# Patient Record
Sex: FEMALE | Race: WHITE | NOT HISPANIC OR LATINO | Employment: UNEMPLOYED | URBAN - METROPOLITAN AREA
[De-identification: names, ages, dates, MRNs, and addresses within clinical notes are randomized per-mention and may not be internally consistent; named-entity substitution may affect disease eponyms.]

---

## 2017-06-27 ENCOUNTER — HOSPITAL ENCOUNTER (INPATIENT)
Facility: REHABILITATION | Age: 62
End: 2017-06-27
Attending: PHYSICAL MEDICINE & REHABILITATION | Admitting: PHYSICAL MEDICINE & REHABILITATION
Payer: COMMERCIAL

## 2017-10-03 ENCOUNTER — HOSPITAL ENCOUNTER (OUTPATIENT)
Dept: RADIOLOGY | Facility: MEDICAL CENTER | Age: 62
End: 2017-10-03
Attending: PHYSICAL MEDICINE & REHABILITATION
Payer: COMMERCIAL

## 2017-10-03 DIAGNOSIS — M54.2 CERVICALGIA: ICD-10-CM

## 2017-10-03 PROCEDURE — 72141 MRI NECK SPINE W/O DYE: CPT

## 2018-04-10 ENCOUNTER — HOSPITAL ENCOUNTER (OUTPATIENT)
Dept: LAB | Facility: MEDICAL CENTER | Age: 63
End: 2018-04-10
Attending: SPECIALIST
Payer: COMMERCIAL

## 2018-04-10 PROCEDURE — 88175 CYTOPATH C/V AUTO FLUID REDO: CPT

## 2018-04-11 LAB — CYTOLOGY REG CYTOL: NORMAL

## 2018-08-29 ENCOUNTER — HOSPITAL ENCOUNTER (OUTPATIENT)
Dept: RADIOLOGY | Facility: MEDICAL CENTER | Age: 63
End: 2018-08-29

## 2018-08-30 ENCOUNTER — HOSPITAL ENCOUNTER (OUTPATIENT)
Dept: RADIOLOGY | Facility: MEDICAL CENTER | Age: 63
End: 2018-08-30
Attending: PHYSICIAN ASSISTANT
Payer: COMMERCIAL

## 2018-08-30 DIAGNOSIS — Z12.39 SCREENING BREAST EXAMINATION: ICD-10-CM

## 2018-08-30 PROCEDURE — 77067 SCR MAMMO BI INCL CAD: CPT

## 2019-05-20 ENCOUNTER — HOSPITAL ENCOUNTER (OUTPATIENT)
Dept: LAB | Facility: MEDICAL CENTER | Age: 64
End: 2019-05-20
Attending: SPECIALIST
Payer: COMMERCIAL

## 2019-05-20 PROCEDURE — 369999 HCHG MISC LAB CHARGE

## 2019-05-20 PROCEDURE — 88142 CYTOPATH C/V THIN LAYER: CPT

## 2019-06-06 LAB — TEST NAME 95000: NORMAL

## 2023-06-23 ENCOUNTER — APPOINTMENT (RX ONLY)
Dept: URBAN - METROPOLITAN AREA CLINIC 6 | Facility: CLINIC | Age: 68
Setting detail: DERMATOLOGY
End: 2023-06-23

## 2023-06-23 DIAGNOSIS — L82.1 OTHER SEBORRHEIC KERATOSIS: ICD-10-CM

## 2023-06-23 DIAGNOSIS — Z71.89 OTHER SPECIFIED COUNSELING: ICD-10-CM

## 2023-06-23 DIAGNOSIS — S31000A OPEN WOUND(S) (MULTIPLE) OF UNSPECIFIED SITE(S), WITHOUT MENTION OF COMPLICATION: ICD-10-CM

## 2023-06-23 DIAGNOSIS — I78.8 OTHER DISEASES OF CAPILLARIES: ICD-10-CM

## 2023-06-23 DIAGNOSIS — L81.4 OTHER MELANIN HYPERPIGMENTATION: ICD-10-CM

## 2023-06-23 PROBLEM — S61.212A LACERATION WITHOUT FOREIGN BODY OF RIGHT MIDDLE FINGER WITHOUT DAMAGE TO NAIL, INITIAL ENCOUNTER: Status: ACTIVE | Noted: 2023-06-23

## 2023-06-23 PROCEDURE — ? DIAGNOSIS COMMENT

## 2023-06-23 PROCEDURE — ? COUNSELING

## 2023-06-23 PROCEDURE — 99203 OFFICE O/P NEW LOW 30 MIN: CPT

## 2023-06-23 ASSESSMENT — LOCATION ZONE DERM
LOCATION ZONE: NECK
LOCATION ZONE: FINGER
LOCATION ZONE: FACE
LOCATION ZONE: HAND

## 2023-06-23 ASSESSMENT — LOCATION DETAILED DESCRIPTION DERM
LOCATION DETAILED: RIGHT RADIAL DORSAL HAND
LOCATION DETAILED: LEFT RADIAL DORSAL HAND
LOCATION DETAILED: LEFT INFERIOR FOREHEAD
LOCATION DETAILED: LEFT CENTRAL LATERAL NECK
LOCATION DETAILED: LEFT INFERIOR CENTRAL MALAR CHEEK
LOCATION DETAILED: LEFT INFERIOR MEDIAL MALAR CHEEK
LOCATION DETAILED: RIGHT MIDDLE FINGER PROXIMAL INTERPHALANGEAL JOINT
LOCATION DETAILED: RIGHT INFERIOR CENTRAL MALAR CHEEK

## 2023-06-23 ASSESSMENT — LOCATION SIMPLE DESCRIPTION DERM
LOCATION SIMPLE: RIGHT CHEEK
LOCATION SIMPLE: RIGHT MIDDLE FINGER
LOCATION SIMPLE: LEFT CHEEK
LOCATION SIMPLE: LEFT FOREHEAD
LOCATION SIMPLE: RIGHT HAND
LOCATION SIMPLE: LEFT HAND
LOCATION SIMPLE: NECK

## 2023-06-23 NOTE — PROCEDURE: DIAGNOSIS COMMENT
Render Risk Assessment In Note?: no
Comment: She cut herself opening a canna food 2 weeks ago. Appears to be healing well with no signs of infection
Detail Level: Simple

## 2024-08-01 ENCOUNTER — APPOINTMENT (OUTPATIENT)
Dept: ADMISSIONS | Facility: MEDICAL CENTER | Age: 69
End: 2024-08-01
Attending: SPECIALIST
Payer: MEDICARE

## 2024-08-07 ENCOUNTER — PRE-ADMISSION TESTING (OUTPATIENT)
Dept: ADMISSIONS | Facility: MEDICAL CENTER | Age: 69
End: 2024-08-07
Attending: SPECIALIST
Payer: MEDICARE

## 2024-08-07 RX ORDER — BENAZEPRIL HYDROCHLORIDE 20 MG/1
20 TABLET ORAL EVERY MORNING
COMMUNITY

## 2024-08-07 RX ORDER — ESCITALOPRAM OXALATE 10 MG/1
10 TABLET ORAL EVERY MORNING
COMMUNITY

## 2024-08-07 RX ORDER — LEVOTHYROXINE SODIUM 75 UG/1
75 TABLET ORAL
COMMUNITY

## 2024-08-19 ENCOUNTER — HOSPITAL ENCOUNTER (OUTPATIENT)
Dept: RADIOLOGY | Facility: MEDICAL CENTER | Age: 69
End: 2024-08-19
Attending: SPECIALIST | Admitting: SPECIALIST
Payer: MEDICARE

## 2024-08-19 ENCOUNTER — PRE-ADMISSION TESTING (OUTPATIENT)
Dept: ADMISSIONS | Facility: MEDICAL CENTER | Age: 69
End: 2024-08-19
Attending: SPECIALIST
Payer: MEDICARE

## 2024-08-19 DIAGNOSIS — Z01.811 PRE-OPERATIVE RESPIRATORY EXAMINATION: ICD-10-CM

## 2024-08-19 DIAGNOSIS — Z01.810 PRE-OPERATIVE CARDIOVASCULAR EXAMINATION: ICD-10-CM

## 2024-08-19 DIAGNOSIS — Z01.812 PRE-OPERATIVE LABORATORY EXAMINATION: ICD-10-CM

## 2024-08-19 LAB
ABO GROUP BLD: NORMAL
ALBUMIN SERPL BCP-MCNC: 4.2 G/DL (ref 3.2–4.9)
ALBUMIN/GLOB SERPL: 1.5 G/DL
ALP SERPL-CCNC: 83 U/L (ref 30–99)
ALT SERPL-CCNC: 15 U/L (ref 2–50)
ANION GAP SERPL CALC-SCNC: 13 MMOL/L (ref 7–16)
AST SERPL-CCNC: 10 U/L (ref 12–45)
BASOPHILS # BLD AUTO: 1.2 % (ref 0–1.8)
BASOPHILS # BLD: 0.13 K/UL (ref 0–0.12)
BILIRUB SERPL-MCNC: 0.2 MG/DL (ref 0.1–1.5)
BLD GP AB SCN SERPL QL: NORMAL
BUN SERPL-MCNC: 11 MG/DL (ref 8–22)
CALCIUM ALBUM COR SERPL-MCNC: 10.7 MG/DL (ref 8.5–10.5)
CALCIUM SERPL-MCNC: 10.9 MG/DL (ref 8.5–10.5)
CHLORIDE SERPL-SCNC: 92 MMOL/L (ref 96–112)
CO2 SERPL-SCNC: 23 MMOL/L (ref 20–33)
CREAT SERPL-MCNC: 0.42 MG/DL (ref 0.5–1.4)
EKG IMPRESSION: NORMAL
EOSINOPHIL # BLD AUTO: 0.11 K/UL (ref 0–0.51)
EOSINOPHIL NFR BLD: 1 % (ref 0–6.9)
ERYTHROCYTE [DISTWIDTH] IN BLOOD BY AUTOMATED COUNT: 53.1 FL (ref 35.9–50)
GFR SERPLBLD CREATININE-BSD FMLA CKD-EPI: 107 ML/MIN/1.73 M 2
GLOBULIN SER CALC-MCNC: 2.8 G/DL (ref 1.9–3.5)
GLUCOSE SERPL-MCNC: 97 MG/DL (ref 65–99)
HCT VFR BLD AUTO: 43.7 % (ref 37–47)
HGB BLD-MCNC: 14.8 G/DL (ref 12–16)
IMM GRANULOCYTES # BLD AUTO: 0.09 K/UL (ref 0–0.11)
IMM GRANULOCYTES NFR BLD AUTO: 0.8 % (ref 0–0.9)
LYMPHOCYTES # BLD AUTO: 2.02 K/UL (ref 1–4.8)
LYMPHOCYTES NFR BLD: 18.1 % (ref 22–41)
MCH RBC QN AUTO: 30.1 PG (ref 27–33)
MCHC RBC AUTO-ENTMCNC: 33.9 G/DL (ref 32.2–35.5)
MCV RBC AUTO: 88.8 FL (ref 81.4–97.8)
MONOCYTES # BLD AUTO: 0.93 K/UL (ref 0–0.85)
MONOCYTES NFR BLD AUTO: 8.3 % (ref 0–13.4)
NEUTROPHILS # BLD AUTO: 7.88 K/UL (ref 1.82–7.42)
NEUTROPHILS NFR BLD: 70.6 % (ref 44–72)
NRBC # BLD AUTO: 0 K/UL
NRBC BLD-RTO: 0 /100 WBC (ref 0–0.2)
PLATELET # BLD AUTO: 302 K/UL (ref 164–446)
PMV BLD AUTO: 9.4 FL (ref 9–12.9)
POTASSIUM SERPL-SCNC: 4.9 MMOL/L (ref 3.6–5.5)
PROT SERPL-MCNC: 7 G/DL (ref 6–8.2)
RBC # BLD AUTO: 4.92 M/UL (ref 4.2–5.4)
RH BLD: NORMAL
SODIUM SERPL-SCNC: 128 MMOL/L (ref 135–145)
WBC # BLD AUTO: 11.2 K/UL (ref 4.8–10.8)

## 2024-08-19 PROCEDURE — 93005 ELECTROCARDIOGRAM TRACING: CPT

## 2024-08-19 PROCEDURE — 93010 ELECTROCARDIOGRAM REPORT: CPT | Performed by: INTERNAL MEDICINE

## 2024-08-19 PROCEDURE — 86901 BLOOD TYPING SEROLOGIC RH(D): CPT

## 2024-08-19 PROCEDURE — 71045 X-RAY EXAM CHEST 1 VIEW: CPT

## 2024-08-19 PROCEDURE — 36415 COLL VENOUS BLD VENIPUNCTURE: CPT

## 2024-08-19 PROCEDURE — 86850 RBC ANTIBODY SCREEN: CPT

## 2024-08-19 PROCEDURE — 86900 BLOOD TYPING SEROLOGIC ABO: CPT

## 2024-08-19 PROCEDURE — 80053 COMPREHEN METABOLIC PANEL: CPT

## 2024-08-19 PROCEDURE — 85025 COMPLETE CBC W/AUTO DIFF WBC: CPT

## 2024-08-29 ENCOUNTER — ANESTHESIA EVENT (OUTPATIENT)
Dept: SURGERY | Facility: MEDICAL CENTER | Age: 69
End: 2024-08-29
Payer: MEDICARE

## 2024-08-30 ENCOUNTER — ANESTHESIA (OUTPATIENT)
Dept: SURGERY | Facility: MEDICAL CENTER | Age: 69
End: 2024-08-30
Payer: MEDICARE

## 2024-08-30 ENCOUNTER — HOSPITAL ENCOUNTER (OUTPATIENT)
Facility: MEDICAL CENTER | Age: 69
End: 2024-08-30
Attending: SPECIALIST | Admitting: SPECIALIST
Payer: MEDICARE

## 2024-08-30 VITALS
HEIGHT: 63 IN | HEART RATE: 70 BPM | BODY MASS INDEX: 22.73 KG/M2 | OXYGEN SATURATION: 98 % | WEIGHT: 128.31 LBS | SYSTOLIC BLOOD PRESSURE: 209 MMHG | TEMPERATURE: 96.7 F | DIASTOLIC BLOOD PRESSURE: 99 MMHG | RESPIRATION RATE: 16 BRPM

## 2024-08-30 LAB
ABO + RH BLD: NORMAL
ALBUMIN SERPL BCP-MCNC: 4.5 G/DL (ref 3.2–4.9)
ALBUMIN/GLOB SERPL: 1.5 G/DL
ALP SERPL-CCNC: 88 U/L (ref 30–99)
ALT SERPL-CCNC: 12 U/L (ref 2–50)
ANION GAP SERPL CALC-SCNC: 15 MMOL/L (ref 7–16)
AST SERPL-CCNC: 17 U/L (ref 12–45)
BILIRUB SERPL-MCNC: 0.3 MG/DL (ref 0.1–1.5)
BUN SERPL-MCNC: 10 MG/DL (ref 8–22)
CALCIUM ALBUM COR SERPL-MCNC: 9.9 MG/DL (ref 8.5–10.5)
CALCIUM SERPL-MCNC: 10.3 MG/DL (ref 8.5–10.5)
CHLORIDE SERPL-SCNC: 95 MMOL/L (ref 96–112)
CO2 SERPL-SCNC: 20 MMOL/L (ref 20–33)
CREAT SERPL-MCNC: 0.37 MG/DL (ref 0.5–1.4)
GFR SERPLBLD CREATININE-BSD FMLA CKD-EPI: 111 ML/MIN/1.73 M 2
GLOBULIN SER CALC-MCNC: 3 G/DL (ref 1.9–3.5)
GLUCOSE SERPL-MCNC: 90 MG/DL (ref 65–99)
PATHOLOGY CONSULT NOTE: NORMAL
POTASSIUM SERPL-SCNC: 4.3 MMOL/L (ref 3.6–5.5)
PROT SERPL-MCNC: 7.5 G/DL (ref 6–8.2)
SODIUM SERPL-SCNC: 130 MMOL/L (ref 135–145)

## 2024-08-30 PROCEDURE — 160009 HCHG ANES TIME/MIN: Performed by: SPECIALIST

## 2024-08-30 PROCEDURE — A9270 NON-COVERED ITEM OR SERVICE: HCPCS | Performed by: ANESTHESIOLOGY

## 2024-08-30 PROCEDURE — 160048 HCHG OR STATISTICAL LEVEL 1-5: Performed by: SPECIALIST

## 2024-08-30 PROCEDURE — 700105 HCHG RX REV CODE 258: Performed by: SPECIALIST

## 2024-08-30 PROCEDURE — 160027 HCHG SURGERY MINUTES - 1ST 30 MINS LEVEL 2: Performed by: SPECIALIST

## 2024-08-30 PROCEDURE — 36415 COLL VENOUS BLD VENIPUNCTURE: CPT

## 2024-08-30 PROCEDURE — 88307 TISSUE EXAM BY PATHOLOGIST: CPT

## 2024-08-30 PROCEDURE — 160002 HCHG RECOVERY MINUTES (STAT): Performed by: SPECIALIST

## 2024-08-30 PROCEDURE — 80053 COMPREHEN METABOLIC PANEL: CPT

## 2024-08-30 PROCEDURE — 700111 HCHG RX REV CODE 636 W/ 250 OVERRIDE (IP): Performed by: ANESTHESIOLOGY

## 2024-08-30 PROCEDURE — 160025 RECOVERY II MINUTES (STATS): Performed by: SPECIALIST

## 2024-08-30 PROCEDURE — 700111 HCHG RX REV CODE 636 W/ 250 OVERRIDE (IP): Mod: JG | Performed by: ANESTHESIOLOGY

## 2024-08-30 PROCEDURE — 160046 HCHG PACU - 1ST 60 MINS PHASE II: Performed by: SPECIALIST

## 2024-08-30 PROCEDURE — 160038 HCHG SURGERY MINUTES - EA ADDL 1 MIN LEVEL 2: Performed by: SPECIALIST

## 2024-08-30 PROCEDURE — 700102 HCHG RX REV CODE 250 W/ 637 OVERRIDE(OP): Performed by: ANESTHESIOLOGY

## 2024-08-30 PROCEDURE — 88305 TISSUE EXAM BY PATHOLOGIST: CPT

## 2024-08-30 PROCEDURE — 160035 HCHG PACU - 1ST 60 MINS PHASE I: Performed by: SPECIALIST

## 2024-08-30 RX ORDER — IBUPROFEN 200 MG
400 TABLET ORAL EVERY 8 HOURS PRN
COMMUNITY

## 2024-08-30 RX ORDER — EPHEDRINE SULFATE 50 MG/ML
5 INJECTION, SOLUTION INTRAVENOUS
Status: DISCONTINUED | OUTPATIENT
Start: 2024-08-30 | End: 2024-08-30 | Stop reason: HOSPADM

## 2024-08-30 RX ORDER — DIPHENHYDRAMINE HYDROCHLORIDE 50 MG/ML
12.5 INJECTION INTRAMUSCULAR; INTRAVENOUS
Status: DISCONTINUED | OUTPATIENT
Start: 2024-08-30 | End: 2024-08-30 | Stop reason: HOSPADM

## 2024-08-30 RX ORDER — SODIUM CHLORIDE, SODIUM LACTATE, POTASSIUM CHLORIDE, CALCIUM CHLORIDE 600; 310; 30; 20 MG/100ML; MG/100ML; MG/100ML; MG/100ML
INJECTION, SOLUTION INTRAVENOUS CONTINUOUS
Status: DISCONTINUED | OUTPATIENT
Start: 2024-08-30 | End: 2024-08-30 | Stop reason: HOSPADM

## 2024-08-30 RX ORDER — HALOPERIDOL 5 MG/ML
1 INJECTION INTRAMUSCULAR
Status: DISCONTINUED | OUTPATIENT
Start: 2024-08-30 | End: 2024-08-30 | Stop reason: HOSPADM

## 2024-08-30 RX ORDER — HYDROMORPHONE HYDROCHLORIDE 1 MG/ML
0.4 INJECTION, SOLUTION INTRAMUSCULAR; INTRAVENOUS; SUBCUTANEOUS
Status: DISCONTINUED | OUTPATIENT
Start: 2024-08-30 | End: 2024-08-30 | Stop reason: HOSPADM

## 2024-08-30 RX ORDER — METOPROLOL TARTRATE 1 MG/ML
1 INJECTION, SOLUTION INTRAVENOUS
Status: DISCONTINUED | OUTPATIENT
Start: 2024-08-30 | End: 2024-08-30 | Stop reason: HOSPADM

## 2024-08-30 RX ORDER — ONDANSETRON 2 MG/ML
4 INJECTION INTRAMUSCULAR; INTRAVENOUS
Status: DISCONTINUED | OUTPATIENT
Start: 2024-08-30 | End: 2024-08-30 | Stop reason: HOSPADM

## 2024-08-30 RX ORDER — HYDROMORPHONE HYDROCHLORIDE 1 MG/ML
0.1 INJECTION, SOLUTION INTRAMUSCULAR; INTRAVENOUS; SUBCUTANEOUS
Status: DISCONTINUED | OUTPATIENT
Start: 2024-08-30 | End: 2024-08-30 | Stop reason: HOSPADM

## 2024-08-30 RX ORDER — SODIUM CHLORIDE, SODIUM LACTATE, POTASSIUM CHLORIDE, CALCIUM CHLORIDE 600; 310; 30; 20 MG/100ML; MG/100ML; MG/100ML; MG/100ML
INJECTION, SOLUTION INTRAVENOUS CONTINUOUS
Status: ACTIVE | OUTPATIENT
Start: 2024-08-30 | End: 2024-08-30

## 2024-08-30 RX ORDER — CELECOXIB 200 MG/1
200 CAPSULE ORAL ONCE
Status: COMPLETED | OUTPATIENT
Start: 2024-08-30 | End: 2024-08-30

## 2024-08-30 RX ORDER — DEXAMETHASONE SODIUM PHOSPHATE 4 MG/ML
INJECTION, SOLUTION INTRA-ARTICULAR; INTRALESIONAL; INTRAMUSCULAR; INTRAVENOUS; SOFT TISSUE PRN
Status: DISCONTINUED | OUTPATIENT
Start: 2024-08-30 | End: 2024-08-30 | Stop reason: SURG

## 2024-08-30 RX ORDER — ACETAMINOPHEN 500 MG
1000 TABLET ORAL ONCE
Status: COMPLETED | OUTPATIENT
Start: 2024-08-30 | End: 2024-08-30

## 2024-08-30 RX ORDER — OXYCODONE HCL 5 MG/5 ML
5 SOLUTION, ORAL ORAL
Status: DISCONTINUED | OUTPATIENT
Start: 2024-08-30 | End: 2024-08-30 | Stop reason: HOSPADM

## 2024-08-30 RX ORDER — MEPERIDINE HYDROCHLORIDE 25 MG/ML
12.5 INJECTION INTRAMUSCULAR; INTRAVENOUS; SUBCUTANEOUS
Status: DISCONTINUED | OUTPATIENT
Start: 2024-08-30 | End: 2024-08-30 | Stop reason: HOSPADM

## 2024-08-30 RX ORDER — MIDAZOLAM HYDROCHLORIDE 1 MG/ML
INJECTION INTRAMUSCULAR; INTRAVENOUS PRN
Status: DISCONTINUED | OUTPATIENT
Start: 2024-08-30 | End: 2024-08-30 | Stop reason: SURG

## 2024-08-30 RX ORDER — MIDAZOLAM HYDROCHLORIDE 1 MG/ML
1 INJECTION INTRAMUSCULAR; INTRAVENOUS
Status: DISCONTINUED | OUTPATIENT
Start: 2024-08-30 | End: 2024-08-30 | Stop reason: HOSPADM

## 2024-08-30 RX ORDER — HYDROMORPHONE HYDROCHLORIDE 1 MG/ML
0.2 INJECTION, SOLUTION INTRAMUSCULAR; INTRAVENOUS; SUBCUTANEOUS
Status: DISCONTINUED | OUTPATIENT
Start: 2024-08-30 | End: 2024-08-30 | Stop reason: HOSPADM

## 2024-08-30 RX ORDER — LABETALOL HYDROCHLORIDE 5 MG/ML
5 INJECTION, SOLUTION INTRAVENOUS
Status: DISCONTINUED | OUTPATIENT
Start: 2024-08-30 | End: 2024-08-30 | Stop reason: HOSPADM

## 2024-08-30 RX ORDER — ONDANSETRON 2 MG/ML
INJECTION INTRAMUSCULAR; INTRAVENOUS PRN
Status: DISCONTINUED | OUTPATIENT
Start: 2024-08-30 | End: 2024-08-30 | Stop reason: SURG

## 2024-08-30 RX ORDER — OXYCODONE HCL 5 MG/5 ML
10 SOLUTION, ORAL ORAL
Status: DISCONTINUED | OUTPATIENT
Start: 2024-08-30 | End: 2024-08-30 | Stop reason: HOSPADM

## 2024-08-30 RX ORDER — LABETALOL HYDROCHLORIDE 5 MG/ML
10 INJECTION, SOLUTION INTRAVENOUS
Status: DISCONTINUED | OUTPATIENT
Start: 2024-08-30 | End: 2024-08-30 | Stop reason: HOSPADM

## 2024-08-30 RX ADMIN — PROPOFOL 1 MG: 10 INJECTION, EMULSION INTRAVENOUS at 12:17

## 2024-08-30 RX ADMIN — PROPOFOL 120 MG: 10 INJECTION, EMULSION INTRAVENOUS at 12:20

## 2024-08-30 RX ADMIN — SODIUM CHLORIDE, POTASSIUM CHLORIDE, SODIUM LACTATE AND CALCIUM CHLORIDE: 600; 310; 30; 20 INJECTION, SOLUTION INTRAVENOUS at 10:32

## 2024-08-30 RX ADMIN — DEXAMETHASONE SODIUM PHOSPHATE 4 MG: 4 INJECTION INTRA-ARTICULAR; INTRALESIONAL; INTRAMUSCULAR; INTRAVENOUS; SOFT TISSUE at 12:29

## 2024-08-30 RX ADMIN — LABETALOL HYDROCHLORIDE 10 MG: 5 INJECTION, SOLUTION INTRAVENOUS at 12:01

## 2024-08-30 RX ADMIN — FENTANYL CITRATE 50 MCG: 50 INJECTION, SOLUTION INTRAMUSCULAR; INTRAVENOUS at 12:45

## 2024-08-30 RX ADMIN — ACETAMINOPHEN 1000 MG: 500 TABLET ORAL at 10:32

## 2024-08-30 RX ADMIN — ONDANSETRON 4 MG: 2 INJECTION INTRAMUSCULAR; INTRAVENOUS at 12:29

## 2024-08-30 RX ADMIN — LABETALOL HYDROCHLORIDE 10 MG: 5 INJECTION, SOLUTION INTRAVENOUS at 10:27

## 2024-08-30 RX ADMIN — CELECOXIB 200 MG: 200 CAPSULE ORAL at 10:32

## 2024-08-30 RX ADMIN — FENTANYL CITRATE 50 MCG: 50 INJECTION, SOLUTION INTRAMUSCULAR; INTRAVENOUS at 12:29

## 2024-08-30 RX ADMIN — LABETALOL HYDROCHLORIDE 5 MG: 5 INJECTION, SOLUTION INTRAVENOUS at 13:05

## 2024-08-30 RX ADMIN — MIDAZOLAM HYDROCHLORIDE 2 MG: 2 INJECTION, SOLUTION INTRAMUSCULAR; INTRAVENOUS at 12:17

## 2024-08-30 ASSESSMENT — FIBROSIS 4 INDEX: FIB4 SCORE: 0.56

## 2024-08-30 ASSESSMENT — PAIN DESCRIPTION - PAIN TYPE: TYPE: SURGICAL PAIN

## 2024-08-30 ASSESSMENT — PAIN SCALES - GENERAL: PAIN_LEVEL: 0

## 2024-08-30 NOTE — OR NURSING
1351- pt arrives via gurney to phase II, ambulates to BR and voids with no difficulty.  Dresses self independently.     1408-PIV d/c;'d intact.  Discharge instructions given to pt and - verbalize understanding    1410- pt escorted out in w/c, all belongings accounted for.

## 2024-08-30 NOTE — ANESTHESIA POSTPROCEDURE EVALUATION
Patient: Pallavi Sivla    Procedure Summary       Date: 08/30/24 Room / Location: [place] [address] / SURGERY [facility]    Anesthesia Start: 1212 Anesthesia Stop: 1304    Procedure: LOOP ELECTOSURGICAL EXCISION PROCEDURE (Vagina ) Diagnosis: (LESS CERVICAL CANCER)    Surgeons: Jared Strauss M.D. Responsible Provider: Tyler Concepcion M.D.    Anesthesia Type: general ASA Status: 2            Final Anesthesia Type: general  Last vitals  BP   Blood Pressure : (!) 209/99, NIBP: (!) 192/93    Temp   36.4 °C (97.5 °F)    Pulse   69   Resp   18    SpO2   97 %      Anesthesia Post Evaluation    Patient location during evaluation: PACU  Patient participation: complete - patient participated  Level of consciousness: awake and alert  Pain score: 0    Airway patency: patent  Anesthetic complications: no  Cardiovascular status: hemodynamically stable  Respiratory status: acceptable  Hydration status: euvolemic    PONV: none          No notable events documented.     Nurse Pain Score: 0 (NPRS)

## 2024-08-30 NOTE — ANESTHESIA PREPROCEDURE EVALUATION
Case: [id number] Date/Time: 08/30/24 1115    Procedure: LOOP ELECTOSURGICAL EXCISION PROCEDURE    Pre-op diagnosis: MODERATE VULVAR DYSPLASIA    Location: [facility] OR 17 / SURGERY [facility]    Surgeons: Jared Strauss M.D.            Relevant Problems   No relevant active problems       Physical Exam    Airway   Mallampati: II  TM distance: >3 FB  Neck ROM: full       Cardiovascular - normal exam  Rhythm: regular  Rate: normal  (-) murmur     Dental - normal exam           Pulmonary - normal exam  Breath sounds clear to auscultation     Abdominal    Neurological - normal exam                   Anesthesia Plan    ASA 2       Plan - general       Airway plan will be LMA          Induction: intravenous    Postoperative Plan: Postoperative administration of opioids is intended.    Pertinent diagnostic labs and testing reviewed    Informed Consent:    Anesthetic plan and risks discussed with patient.    Use of blood products discussed with: patient whom consented to blood products.

## 2024-08-30 NOTE — DISCHARGE INSTRUCTIONS
What to Expect Post Anesthesia    Rest and take it easy for the first 24 hours.  A responsible adult is recommended to remain with you during that time.  It is normal to feel sleepy.  We encourage you to not do anything that requires balance, judgment or coordination.    FOR 24 HOURS DO NOT:  Drive, operate machinery or run household appliances.  Drink beer or alcoholic beverages.  Make important decisions or sign legal documents.    To avoid nausea, slowly advance diet as tolerated, avoiding spicy or greasy foods for the first day.  Add more substantial food to your diet according to your provider's instructions.  Babies can be fed formula or breast milk as soon as they are hungry.  INCREASE FLUIDS AND FIBER TO AVOID CONSTIPATION.    MILD FLU-LIKE SYMPTOMS ARE NORMAL.  YOU MAY EXPERIENCE GENERALIZED MUSCLE ACHES, THROAT IRRITATION, HEADACHE AND/OR SOME NAUSEA.    If any questions arise, call your provider.  If your provider is not available, please feel free to call the Surgical Center at [phone].    MEDICATIONS: Resume taking daily medication.  Take prescribed pain medication with food.  If no medication is prescribed, you may take non-aspirin pain medication if needed.  PAIN MEDICATION CAN BE VERY CONSTIPATING.  Take a stool softener or laxative such as senokot, pericolace, or milk of magnesia if needed.    Last pain medication given Tylenol and Celebrex (NSAID, similar to ibuprofen) at 10:30am        1. No heavy lifting greater than 10 pounds for a minimum 4 weeks  2. Nothing in the vagina (ie no tampons, douching, intercourse) for a minimum of 4 weeks  3. No driving while taking narcotics  4. Call our office [phone] if you develop any fevers, chills, nausea/vomiting, heavy vaginal bleeding, or redness, tenderness, and/or  drainage from your wound, if you have persistent watery discharge while ambulating or stool draining from the vagina.  5. Showering with warm water is ok, no bathing or  swimming  6. You may have vaginal spotting or light bleeding which is normal.  7. If you have not had a bowel movement for 2 days, please take over the counter Milk of Magnesium, 1 tablespoon every 4 hours. After 4  doses and if you still have not had a bowel movement, please call your doctor.  8. You may eat a soft diet, such as soup, liquid, for day #1 and if tolerating you may resume your regular diet

## 2024-08-30 NOTE — PROGRESS NOTES
Medication history reviewed with PT at bedside    Med rec is complete per PT reporting    Allergies reviewed.     Patient denies any outpatient antibiotics in the last 30 days.     Patient is not taking anticoagulants.    Preferred pharmacy for this visit - [facility] on [address] ([phone])

## 2024-08-30 NOTE — OR NURSING
Assume care for patient in pre-op. Allergies and NPO status verified. Consents for surgical procedure signed and on chart. PIV started.  Call light within reach. Bed at lowest position.  0957 notified anesthesiologist regarding pt /103 and he ordered lobetalol 10 mg and may give another dose after 15 minutes if bp is more than 160.   rechecked bp of 209/99 and gave another dose of lobetalol 10 mg at 1201 bp is 180/92.

## 2024-08-30 NOTE — OR SURGEON
Immediate Post OP Note    PreOp Diagnosis: Cervical lesion rule out cervical cancer    PostOp Diagnosis: same as above      Procedure(s):  LOOP ELECTOSURGICAL EXCISION PROCEDURE - Wound Class: Clean Contaminated    Surgeon(s):  Jared Strauss M.D.    Anesthesiologist/Type of Anesthesia:  Anesthesiologist: Tyler Concepcion M.D./General    Surgical Staff:  Circulator: Irina Stephens Circulator: Lindsay Cruz R.N.  Scrub Person: Nola Nixon    Specimens removed if any:  ID Type Source Tests Collected by Time Destination   A : CONE Tissue Cervix PATHOLOGY SPECIMEN Jared Strauss M.D. 8/30/2024 12:50 PM    B : ENDOCERVICAL CURETTAGE Tissue Cervix PATHOLOGY SPECIMEN Jared Strauss M.D. 8/30/2024 12:51 PM        Estimated Blood Loss: minimal     Findings: normal cervix, bimanual examination revealed normal uterus with no evidence of parametrial involvement no evidence of adnexal masses or cul-de-sac nodularity.    Complications: none        8/30/2024 1:32 PM Jared Strauss M.D.

## 2024-08-30 NOTE — OP REPORT
PreOp Diagnosis: Cervical lesion rule out cervical cancer     PostOp Diagnosis: same as above        Procedure(s):  LOOP ELECTOSURGICAL EXCISION PROCEDURE - Wound Class: Clean Contaminated     Surgeon(s):  Jared Strauss M.D.     Anesthesiologist/Type of Anesthesia:  Anesthesiologist: Tyler Concepcion M.D./General     Surgical Staff:  Circulator: Irina Ross  Relief Circulator: Lindsay Cruz R.N.  Scrub Person: Nola SUZANNE Nixon     Specimens removed if any:  ID Type Source Tests Collected by Time Destination   A : CONE Tissue Cervix PATHOLOGY SPECIMEN Jared Strauss M.D. 8/30/2024 12:50 PM     B : ENDOCERVICAL CURETTAGE Tissue Cervix PATHOLOGY SPECIMEN Jared Strauss M.D. 8/30/2024 12:51 PM           Estimated Blood Loss: minimal      Findings: normal cervix, bimanual examination revealed normal uterus with no evidence of parametrial involvement no evidence of adnexal masses or cul-de-sac nodularity.     Complications: none       Indication: Mrs. 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 She has been monitored on and off by our office as well as Dr. Schultz. She underwent LEEP conization of the cervix and extensive vaporization of the vulva on 8/2/2011. She has hisotry of LGSIL with HR HPV. On 4/18/24 Pap smear results showed atypical squamous cells HPV positive Mrs. Silva was evalauted with coloposcopy and she was found to have suspicious lesion. Given her history she is recommended. to undergo cone biopsy to rule out recurrent disease. Thus she is being admitted to undergo  LEEP cone bx to rule out invasive cancer.     Procedure: After achieving adequate anesthesia patient was prepped and draped in place modified dorsolithotomy position.  Surgical timeout was called after surgical team agreed we proceeded.    Initially I performed examination esthesia the findings are noted as above.  Upon the completion of this heavy  weighted speculum was placed in the posterior vagina Batista retractor was placed in the anterior vagina.  Cervix was brought to view single-tooth tenaculum was then cauterized to the anterior lip of the cervix.  A 1 x 1 cm loop excision was then performed with excellent hemostasis.  This was followed by ECC.  Minimal tissue was obtained.  Patient tolerated procedure well without any difficulties was extubated and transferred to the PACU in stable condition.

## 2024-08-30 NOTE — OR NURSING
1301: To PACU from OR via gurney, awake/alert, denies pain/nausea,respirations spontaneous and non-labored. No visible mónica drainage. Plan antihypertensive. Pt states she is routinely on antihypertensives but stopped 2 days ago per pre-op instructions.  1310: O2 d/jerman, commenced po water.  1315: Tolerates po water.  1330: No change in surgical site assessment.Meets criteria to transfer to Stage 2.

## 2024-08-30 NOTE — ANESTHESIA PROCEDURE NOTES
Airway    Date/Time: 8/30/2024 12:24 PM    Performed by: Tyler Concepcion M.D.  Authorized by: Tyler Concepcion M.D.    Location:  OR  Urgency:  Elective  Indications for Airway Management:  Anesthesia      Spontaneous Ventilation: absent    Sedation Level:  Deep  Preoxygenated: Yes    Mask Difficulty Assessment:  1 - vent by mask  Final Airway Type:  Supraglottic airway  Final Supraglottic Airway:  Standard LMA    SGA Size:  4  Number of Attempts at Approach:  1

## 2024-08-30 NOTE — ANESTHESIA TIME REPORT
Anesthesia Start and Stop Event Times       Date Time Event    8/30/2024 1150 Ready for Procedure     1212 Anesthesia Start     1304 Anesthesia Stop          Responsible Staff  08/30/24      Name Role Begin End    Tyler Concepcion M.D. Anesth 1212 1304          Overtime Reason:  no overtime (within assigned shift)    Comments:

## 2025-05-29 ENCOUNTER — APPOINTMENT (OUTPATIENT)
Dept: ADMISSIONS | Facility: MEDICAL CENTER | Age: 70
End: 2025-05-29
Attending: SPECIALIST
Payer: MEDICARE

## 2025-06-09 ENCOUNTER — PRE-ADMISSION TESTING (OUTPATIENT)
Dept: ADMISSIONS | Facility: MEDICAL CENTER | Age: 70
End: 2025-06-09
Attending: SPECIALIST
Payer: MEDICARE

## 2025-06-09 NOTE — PREADMIT AVS NOTE
Current Medications   Medication Instructions    ibuprofen (MOTRIN) 200 MG Tab Stop 5 days before surgery    levothyroxine (SYNTHROID) 75 MCG Tab Continue taking as prescribed.    benazepril (LOTENSIN) 20 MG Tab Stop 24 hours before surgery    escitalopram (LEXAPRO) 10 MG Tab Continue taking as prescribed.    GARLIC PO Stop 7 days before surgery    CALCIUM WITH VITAMIN D  Stop 7 days before surgery

## 2025-06-13 ENCOUNTER — ANESTHESIA EVENT (OUTPATIENT)
Dept: SURGERY | Facility: MEDICAL CENTER | Age: 70
End: 2025-06-13
Payer: MEDICARE

## 2025-06-13 ENCOUNTER — HOSPITAL ENCOUNTER (OUTPATIENT)
Facility: MEDICAL CENTER | Age: 70
End: 2025-06-13
Attending: SPECIALIST | Admitting: SPECIALIST
Payer: MEDICARE

## 2025-06-13 ENCOUNTER — ANESTHESIA (OUTPATIENT)
Dept: SURGERY | Facility: MEDICAL CENTER | Age: 70
End: 2025-06-13
Payer: MEDICARE

## 2025-06-13 VITALS
DIASTOLIC BLOOD PRESSURE: 79 MMHG | OXYGEN SATURATION: 93 % | HEART RATE: 85 BPM | WEIGHT: 128.75 LBS | SYSTOLIC BLOOD PRESSURE: 178 MMHG | BODY MASS INDEX: 22.81 KG/M2 | HEIGHT: 63 IN | RESPIRATION RATE: 16 BRPM | TEMPERATURE: 96.9 F

## 2025-06-13 LAB
APTT PPP: 35 SEC (ref 24.7–36)
EKG IMPRESSION: NORMAL
INR PPP: 0.93 (ref 0.87–1.13)
PATHOLOGY CONSULT NOTE: NORMAL
PROTHROMBIN TIME: 12.4 SEC (ref 12–14.6)

## 2025-06-13 PROCEDURE — 700111 HCHG RX REV CODE 636 W/ 250 OVERRIDE (IP): Performed by: STUDENT IN AN ORGANIZED HEALTH CARE EDUCATION/TRAINING PROGRAM

## 2025-06-13 PROCEDURE — 160048 HCHG OR STATISTICAL LEVEL 1-5: Performed by: SPECIALIST

## 2025-06-13 PROCEDURE — 700105 HCHG RX REV CODE 258: Performed by: SPECIALIST

## 2025-06-13 PROCEDURE — 88305 TISSUE EXAM BY PATHOLOGIST: CPT | Mod: 26 | Performed by: PATHOLOGY

## 2025-06-13 PROCEDURE — 85730 THROMBOPLASTIN TIME PARTIAL: CPT

## 2025-06-13 PROCEDURE — 160015 HCHG STAT PREOP MINUTES: Performed by: SPECIALIST

## 2025-06-13 PROCEDURE — 700102 HCHG RX REV CODE 250 W/ 637 OVERRIDE(OP): Performed by: STUDENT IN AN ORGANIZED HEALTH CARE EDUCATION/TRAINING PROGRAM

## 2025-06-13 PROCEDURE — 93005 ELECTROCARDIOGRAM TRACING: CPT | Mod: TC | Performed by: SPECIALIST

## 2025-06-13 PROCEDURE — A9270 NON-COVERED ITEM OR SERVICE: HCPCS | Performed by: STUDENT IN AN ORGANIZED HEALTH CARE EDUCATION/TRAINING PROGRAM

## 2025-06-13 PROCEDURE — 700101 HCHG RX REV CODE 250: Performed by: STUDENT IN AN ORGANIZED HEALTH CARE EDUCATION/TRAINING PROGRAM

## 2025-06-13 PROCEDURE — 160193 HCHG PACU STANDARD - 1ST 60 MINS: Performed by: SPECIALIST

## 2025-06-13 PROCEDURE — 85610 PROTHROMBIN TIME: CPT

## 2025-06-13 PROCEDURE — 88342 IMHCHEM/IMCYTCHM 1ST ANTB: CPT | Mod: 91 | Performed by: PATHOLOGY

## 2025-06-13 PROCEDURE — 160046 HCHG PACU - 1ST 60 MINS PHASE II: Performed by: SPECIALIST

## 2025-06-13 PROCEDURE — 160025 RECOVERY II MINUTES (STATS): Performed by: SPECIALIST

## 2025-06-13 PROCEDURE — 160038 HCHG SURGERY MINUTES - EA ADDL 1 MIN LEVEL 2: Performed by: SPECIALIST

## 2025-06-13 PROCEDURE — 160191 HCHG ANESTHESIA STANDARD: Performed by: SPECIALIST

## 2025-06-13 PROCEDURE — 700101 HCHG RX REV CODE 250: Performed by: SPECIALIST

## 2025-06-13 PROCEDURE — 88305 TISSUE EXAM BY PATHOLOGIST: CPT | Performed by: PATHOLOGY

## 2025-06-13 PROCEDURE — 93010 ELECTROCARDIOGRAM REPORT: CPT | Performed by: INTERNAL MEDICINE

## 2025-06-13 PROCEDURE — 160002 HCHG RECOVERY MINUTES (STAT): Performed by: SPECIALIST

## 2025-06-13 PROCEDURE — 700105 HCHG RX REV CODE 258: Performed by: STUDENT IN AN ORGANIZED HEALTH CARE EDUCATION/TRAINING PROGRAM

## 2025-06-13 PROCEDURE — 160027 HCHG SURGERY MINUTES - 1ST 30 MINS LEVEL 2: Performed by: SPECIALIST

## 2025-06-13 PROCEDURE — 88342 IMHCHEM/IMCYTCHM 1ST ANTB: CPT | Mod: 26 | Performed by: PATHOLOGY

## 2025-06-13 PROCEDURE — 36415 COLL VENOUS BLD VENIPUNCTURE: CPT

## 2025-06-13 RX ORDER — ACETAMINOPHEN 500 MG
500 TABLET ORAL ONCE
Status: COMPLETED | OUTPATIENT
Start: 2025-06-13 | End: 2025-06-13

## 2025-06-13 RX ORDER — SODIUM CHLORIDE, SODIUM LACTATE, POTASSIUM CHLORIDE, CALCIUM CHLORIDE 600; 310; 30; 20 MG/100ML; MG/100ML; MG/100ML; MG/100ML
INJECTION, SOLUTION INTRAVENOUS
Status: DISCONTINUED | OUTPATIENT
Start: 2025-06-13 | End: 2025-06-13 | Stop reason: SURG

## 2025-06-13 RX ORDER — HYDRALAZINE HYDROCHLORIDE 20 MG/ML
5 INJECTION INTRAMUSCULAR; INTRAVENOUS
Status: DISCONTINUED | OUTPATIENT
Start: 2025-06-13 | End: 2025-06-13 | Stop reason: HOSPADM

## 2025-06-13 RX ORDER — CELECOXIB 200 MG/1
200 CAPSULE ORAL ONCE
Status: COMPLETED | OUTPATIENT
Start: 2025-06-13 | End: 2025-06-13

## 2025-06-13 RX ORDER — ONDANSETRON 2 MG/ML
4 INJECTION INTRAMUSCULAR; INTRAVENOUS
Status: DISCONTINUED | OUTPATIENT
Start: 2025-06-13 | End: 2025-06-13 | Stop reason: HOSPADM

## 2025-06-13 RX ORDER — CEFAZOLIN SODIUM 1 G/3ML
INJECTION, POWDER, FOR SOLUTION INTRAMUSCULAR; INTRAVENOUS PRN
Status: DISCONTINUED | OUTPATIENT
Start: 2025-06-13 | End: 2025-06-13 | Stop reason: SURG

## 2025-06-13 RX ORDER — BUPIVACAINE HYDROCHLORIDE AND EPINEPHRINE 2.5; 5 MG/ML; UG/ML
INJECTION, SOLUTION EPIDURAL; INFILTRATION; INTRACAUDAL; PERINEURAL
Status: DISCONTINUED | OUTPATIENT
Start: 2025-06-13 | End: 2025-06-13 | Stop reason: HOSPADM

## 2025-06-13 RX ORDER — ACETAMINOPHEN 325 MG/1
325 TABLET ORAL EVERY 4 HOURS PRN
COMMUNITY

## 2025-06-13 RX ORDER — MIDAZOLAM HYDROCHLORIDE 1 MG/ML
INJECTION INTRAMUSCULAR; INTRAVENOUS PRN
Status: DISCONTINUED | OUTPATIENT
Start: 2025-06-13 | End: 2025-06-13 | Stop reason: SURG

## 2025-06-13 RX ORDER — LIDOCAINE HYDROCHLORIDE 20 MG/ML
INJECTION, SOLUTION EPIDURAL; INFILTRATION; INTRACAUDAL; PERINEURAL PRN
Status: DISCONTINUED | OUTPATIENT
Start: 2025-06-13 | End: 2025-06-13 | Stop reason: SURG

## 2025-06-13 RX ORDER — ONDANSETRON 2 MG/ML
INJECTION INTRAMUSCULAR; INTRAVENOUS PRN
Status: DISCONTINUED | OUTPATIENT
Start: 2025-06-13 | End: 2025-06-13 | Stop reason: SURG

## 2025-06-13 RX ORDER — ALBUTEROL SULFATE 5 MG/ML
2.5 SOLUTION RESPIRATORY (INHALATION)
Status: DISCONTINUED | OUTPATIENT
Start: 2025-06-13 | End: 2025-06-13 | Stop reason: HOSPADM

## 2025-06-13 RX ORDER — HALOPERIDOL 5 MG/ML
1 INJECTION INTRAMUSCULAR
Status: DISCONTINUED | OUTPATIENT
Start: 2025-06-13 | End: 2025-06-13 | Stop reason: HOSPADM

## 2025-06-13 RX ORDER — SODIUM CHLORIDE, SODIUM LACTATE, POTASSIUM CHLORIDE, CALCIUM CHLORIDE 600; 310; 30; 20 MG/100ML; MG/100ML; MG/100ML; MG/100ML
INJECTION, SOLUTION INTRAVENOUS CONTINUOUS
Status: DISCONTINUED | OUTPATIENT
Start: 2025-06-13 | End: 2025-06-13 | Stop reason: HOSPADM

## 2025-06-13 RX ORDER — EPHEDRINE SULFATE 50 MG/ML
5 INJECTION, SOLUTION INTRAVENOUS
Status: DISCONTINUED | OUTPATIENT
Start: 2025-06-13 | End: 2025-06-13 | Stop reason: HOSPADM

## 2025-06-13 RX ORDER — SODIUM CHLORIDE, SODIUM LACTATE, POTASSIUM CHLORIDE, CALCIUM CHLORIDE 600; 310; 30; 20 MG/100ML; MG/100ML; MG/100ML; MG/100ML
INJECTION, SOLUTION INTRAVENOUS CONTINUOUS
Status: ACTIVE | OUTPATIENT
Start: 2025-06-13 | End: 2025-06-13

## 2025-06-13 RX ORDER — CETIRIZINE HYDROCHLORIDE 10 MG/1
10 TABLET ORAL DAILY
COMMUNITY

## 2025-06-13 RX ORDER — EPHEDRINE SULFATE 50 MG/ML
INJECTION, SOLUTION INTRAVENOUS PRN
Status: DISCONTINUED | OUTPATIENT
Start: 2025-06-13 | End: 2025-06-13 | Stop reason: SURG

## 2025-06-13 RX ORDER — ACETAMINOPHEN 500 MG
1000 TABLET ORAL ONCE
Status: DISCONTINUED | OUTPATIENT
Start: 2025-06-13 | End: 2025-06-13 | Stop reason: DRUGHIGH

## 2025-06-13 RX ORDER — DIPHENHYDRAMINE HYDROCHLORIDE 50 MG/ML
12.5 INJECTION, SOLUTION INTRAMUSCULAR; INTRAVENOUS
Status: DISCONTINUED | OUTPATIENT
Start: 2025-06-13 | End: 2025-06-13 | Stop reason: HOSPADM

## 2025-06-13 RX ORDER — DEXAMETHASONE SODIUM PHOSPHATE 4 MG/ML
INJECTION, SOLUTION INTRA-ARTICULAR; INTRALESIONAL; INTRAMUSCULAR; INTRAVENOUS; SOFT TISSUE PRN
Status: DISCONTINUED | OUTPATIENT
Start: 2025-06-13 | End: 2025-06-13 | Stop reason: SURG

## 2025-06-13 RX ADMIN — FENTANYL CITRATE 25 MCG: 50 INJECTION, SOLUTION INTRAMUSCULAR; INTRAVENOUS at 11:10

## 2025-06-13 RX ADMIN — DEXAMETHASONE SODIUM PHOSPHATE 4 MG: 4 INJECTION INTRA-ARTICULAR; INTRALESIONAL; INTRAMUSCULAR; INTRAVENOUS; SOFT TISSUE at 10:50

## 2025-06-13 RX ADMIN — CELECOXIB 200 MG: 200 CAPSULE ORAL at 08:39

## 2025-06-13 RX ADMIN — CEFAZOLIN 2 G: 1 INJECTION, POWDER, FOR SOLUTION INTRAMUSCULAR; INTRAVENOUS at 10:47

## 2025-06-13 RX ADMIN — ONDANSETRON 4 MG: 2 INJECTION INTRAMUSCULAR; INTRAVENOUS at 11:10

## 2025-06-13 RX ADMIN — FENTANYL CITRATE 25 MCG: 50 INJECTION, SOLUTION INTRAMUSCULAR; INTRAVENOUS at 11:03

## 2025-06-13 RX ADMIN — PROPOFOL 100 MG: 10 INJECTION, EMULSION INTRAVENOUS at 10:47

## 2025-06-13 RX ADMIN — SODIUM CHLORIDE, POTASSIUM CHLORIDE, SODIUM LACTATE AND CALCIUM CHLORIDE: 600; 310; 30; 20 INJECTION, SOLUTION INTRAVENOUS at 10:42

## 2025-06-13 RX ADMIN — ACETAMINOPHEN 500 MG: 500 TABLET ORAL at 08:39

## 2025-06-13 RX ADMIN — LIDOCAINE HYDROCHLORIDE 50 MG: 20 INJECTION, SOLUTION EPIDURAL; INFILTRATION; INTRACAUDAL; PERINEURAL at 10:47

## 2025-06-13 RX ADMIN — SODIUM CHLORIDE, POTASSIUM CHLORIDE, SODIUM LACTATE AND CALCIUM CHLORIDE: 600; 310; 30; 20 INJECTION, SOLUTION INTRAVENOUS at 08:39

## 2025-06-13 RX ADMIN — EPHEDRINE SULFATE 20 MG: 50 INJECTION, SOLUTION INTRAVENOUS at 10:58

## 2025-06-13 RX ADMIN — MIDAZOLAM HYDROCHLORIDE 1.5 MG: 1 INJECTION, SOLUTION INTRAMUSCULAR; INTRAVENOUS at 10:40

## 2025-06-13 ASSESSMENT — FIBROSIS 4 INDEX
FIB4 SCORE: 1.09
FIB4 SCORE: 1.09

## 2025-06-13 ASSESSMENT — PAIN SCALES - GENERAL: PAIN_LEVEL: 2

## 2025-06-13 NOTE — ANESTHESIA TIME REPORT
Anesthesia Start and Stop Event Times       Date Time Event    6/13/2025 0855 Ready for Procedure     1042 Anesthesia Start     1117 Anesthesia Stop          Responsible Staff  06/13/25      Name Role Begin End    Yunior Concepcion D.O. Anesth 1042 1117          Overtime Reason:  no overtime (within assigned shift)    Comments:

## 2025-06-13 NOTE — ANESTHESIA POSTPROCEDURE EVALUATION
Patient: Pallavi Silva    Procedure Summary       Date: 06/13/25 Room / Location: [place] [address] / SURGERY [facility]    Anesthesia Start: 1042 Anesthesia Stop: 1117    Procedure: VAGINAL EXAM UNDER ANESTHESIA, RIGHT VULVAR BIOPSY AND SUBURETHRAL BIOPSY (Vulva) Diagnosis: (VULVAR DYSPLASIA)    Surgeons: Jared Strauss M.D. Responsible Provider: Yunior Concepcion D.O.    Anesthesia Type: general ASA Status: 2            Final Anesthesia Type: general  Last vitals  BP   Blood Pressure : (!) 186/77    Temp   36.7 °C (98 °F)    Pulse   80   Resp   17    SpO2   97 %      Anesthesia Post Evaluation    Patient location during evaluation: PACU  Patient participation: complete - patient participated  Level of consciousness: awake and alert  Pain score: 2    Airway patency: patent  Anesthetic complications: no  Cardiovascular status: hemodynamically stable  Respiratory status: acceptable  Hydration status: euvolemic    PONV: none          No notable events documented.     Nurse Pain Score: 0 (NPRS)

## 2025-06-13 NOTE — ANESTHESIA PROCEDURE NOTES
Airway    Date/Time: 6/13/2025 10:48 AM    Performed by: Yunior Concepcion D.O.  Authorized by: Yunior Concepcion D.O.    Location:  OR  Urgency:  Elective  Indications for Airway Management:  Anesthesia      Spontaneous Ventilation: absent    Sedation Level:  Deep  Preoxygenated: Yes    Final Airway Type:  Supraglottic airway  Final Supraglottic Airway:  Standard LMA    SGA Size:  4  Number of Attempts at Approach:  1

## 2025-06-13 NOTE — PROGRESS NOTES
Pharmacy Medication Reconciliation      ~Medication reconciliation updated and complete per patient   ~Allergies have been verified and updated   ~No oral ABX within the last 30 days  ~Is dispense history available in EPIC: no  ~Patient home pharmacy :  [facility] [phone]      ~Anticoagulants (rivaroxaban, apixaban, edoxaban, dabigatran, warfarin, enoxaparin) taken in the last 14 days? no

## 2025-06-13 NOTE — OP REPORT
PreOp Diagnosis: Right vulvar dysplasia rule out BIANKA 3  Questionable suburethral lesion  History of cervical cancer status post chemoradiation        PostOp Diagnosis: Right superior and inferior vulvar dysplasia  No evidence of suburethral lesion        Procedure(s):  VAGINAL EXAM UNDER ANESTHESIA, RIGHT VULVAR BIOPSY - Wound Class: Clean Contaminated     Surgeon(s):  Jared Strauss M.D.     Anesthesiologist/Type of Anesthesia:  Anesthesiologist: Yunior Concepcion D.O./General     Surgical Staff:  Assistant: Katie Mccollum P.A.-C.  Circulator: Mya Singh R.N.  Scrub Person: Nolasean Kooland     Specimens removed if any:  ID Type Source Tests Collected by Time Destination   A : Right inferior vulva Other Other PATHOLOGY SPECIMEN Jared Strauss M.D. 6/13/2025 11:05 AM     B : Right superior vulva Other Other PATHOLOGY SPECIMEN Jared Strauss M.D. 6/13/2025 11:05 AM           Estimated Blood Loss: Minimal     Findings: Examination anesthesia revealed a dysplastic lesion in the right inferior vulvar and erythematous lesion in the right superior vulva although this did not appear dysplastic and looks worrisome.  Previous suburethral lesion that was noted in the office I cannot appreciate.  Thus for this reason we did not proceed with a biopsy of the suburethral.  We have performed a 5 mm punch biopsy at the right superior and inferior vulva.     Complications: None     Indication: Mrs. Sagar Corral is a pleasant 69-year-old female who has history of cervical cancer.  Patient is being monitored for cervical cancer over the years.  Patient most recently had a routine checkup.  She was noted to have an ASCUS abnormal Pap.  Patient was seen and examined.  She was noted to have vulvar lesion and a suburethral lesion that was suspicious for BIANKA 3.  Patient was advised undergo examination under anesthesia and a biopsy.  Intraoperative biopsy was indicated as patient previously had radiation was concerned that we would not have  sufficient control of potential bleeding thus she was done under examination anesthesia for this procedure.    Procedure: After achieving adequate anesthesia patient was prepped and draped in place modified dorsolithotomy position.  Surgical timeout was called if the surgical team agreed with proceeded.    Examination anesthesia was performed.  Lesion was noted.  5 mm [place] punch biopsy was used to perform a biopsy below the subcutaneous tissue the tissue was then excised.  The excision site was then reapproximated with 2-0 Monocryl suture in a mattress fashion excellent hemostasis achieved.  The area of the biopsy site was then injected with quarter percent Marcaine for local anesthetic effect.  Patient tolerated procedure well without any difficulties was extubated and transferred to the PACU in stable condition.

## 2025-06-13 NOTE — OR NURSING
/79, asymptomatic, Per Dr Concepcion pt okay to d/c home and resume home HTN medications. Should pt experience HTN symptoms,return to ER. Pt and family advised and verbalized understanding.

## 2025-06-13 NOTE — DISCHARGE INSTRUCTIONS
HOME CARE INSTRUCTIONS    ACTIVITY: Rest and take it easy for the first 24 hours.  A responsible adult is recommended to remain with you during that time.  It is normal to feel sleepy.  We encourage you to not do anything that requires balance, judgment or coordination.    FOR 24 HOURS DO NOT:  Drive, operate machinery or run household appliances.  Drink beer or alcoholic beverages.  Make important decisions or sign legal documents.    SPECIAL INSTRUCTIONS:   1. Call our office [phone] if you develop any fevers, chills, nausea/vomiting, vulvar redness, tenderness, and/or drainage from your wound.  2. Showering with warm water is ok, no bathing or swimming  3. You may remove wound dressing on postoperative day 1, and place lose bandages for two weeks  4. You may have vaginal spotting or light bleeding which is normal.  5. If you have not had a bowel movement for 2 days, please take over the counter Milk of Magnesium, 1 tablespoon every 4 hours. After 4 doses and if you still have not had a bowel movement, please call your doctor.  6. You may eat a soft diet, such as soup, liquid, for day #1 and if tolerating you may resume your regular diet    DIET: To avoid nausea, slowly advance diet as tolerated, avoiding spicy or greasy foods for the first day.  Add more substantial food to your diet according to your physician's instructions.  Babies can be fed formula or breast milk as soon as they are hungry.  INCREASE FLUIDS AND FIBER TO AVOID CONSTIPATION.    SURGICAL DRESSING/BATHING: See above.    MEDICATIONS: Resume taking daily medication.  Take prescribed pain medication with food.  If no medication is prescribed, you may take non-aspirin pain medication if needed.  PAIN MEDICATION CAN BE VERY CONSTIPATING.  Take a stool softener or laxative such as senokot, pericolace, or milk of magnesia if needed.    No new prescription. Last pain medication given at Tylenol at 08:39 am.    A follow-up appointment should be  arranged with your doctor in 1-2 weeks; call to schedule.    You should CALL YOUR PHYSICIAN if you develop:  Fever greater than 101 degrees F.  Pain not relieved by medication, or persistent nausea or vomiting.  Excessive bleeding (blood soaking through dressing) or unexpected drainage from the wound.  Extreme redness or swelling around the incision site, drainage of pus or foul smelling drainage.  Inability to urinate or empty your bladder within 8 hours.  Problems with breathing or chest pain.    You should call 911 if you develop problems with breathing or chest pain.  If you are unable to contact your doctor or surgical center, you should go to the nearest emergency room or urgent care center.  Physician's telephone #: Dr. Strauss [phone]    MILD FLU-LIKE SYMPTOMS ARE NORMAL.  YOU MAY EXPERIENCE GENERALIZED MUSCLE ACHES, THROAT IRRITATION, HEADACHE AND/OR SOME NAUSEA.    If any questions arise, call your doctor.  If your doctor is not available, please feel free to call the Surgical Center at [phone].  The Center is open Monday through Friday from 7AM to 7PM.      A registered nurse may call you a few days after your surgery to see how you are doing after your procedure.    You may also receive a survey in the mail within the next two weeks and we ask that you take a few moments to complete the survey and return it to us.  Our goal is to provide you with very good care and we value your comments.     Depression / Suicide Risk    As you are discharged from this [facility] facility, it is important to learn how to keep safe from harming yourself.    Recognize the warning signs:  Abrupt changes in personality, positive or negative- including increase in energy   Giving away possessions  Change in eating patterns- significant weight changes-  positive or negative  Change in sleeping patterns- unable to sleep or sleeping all the time   Unwillingness or inability to communicate  Depression  Unusual sadness,  discouragement and loneliness  Talk of wanting to die  Neglect of personal appearance   Rebelliousness- reckless behavior  Withdrawal from people/activities they love  Confusion- inability to concentrate     If you or a loved one observes any of these behaviors or has concerns about self-harm, here's what you can do:  Talk about it- your feelings and reasons for harming yourself  Remove any means that you might use to hurt yourself (examples: pills, rope, extension cords, firearm)  Get professional help from the community (Mental Health, Substance Abuse, psychological counseling)  Do not be alone:Call your Safe Contact- someone whom you trust who will be there for you.  Call your local CRISIS HOTLINE 169-0515 or 071-924-2723  Call your local Children's Mobile Crisis Response Team [place] (403) 191-6283 or www.Artabase  Call the toll free National Suicide Prevention Hotlines   National Suicide Prevention Lifeline 941-132-BLIO (8635)  National Hope Line Network 800-SUICIDE (803-9932)    I acknowledge receipt and understanding of these Home Care instructions.

## 2025-06-13 NOTE — OR SURGEON
Immediate Post OP Note    PreOp Diagnosis: Right vulvar dysplasia rule out BIANKA 3  Questionable suburethral lesion  History of cervical cancer status post chemoradiation      PostOp Diagnosis: Right superior and inferior vulvar dysplasia  No evidence of suburethral lesion      Procedure(s):  VAGINAL EXAM UNDER ANESTHESIA, RIGHT VULVAR BIOPSY - Wound Class: Clean Contaminated    Surgeon(s):  Jared Strauss M.D.    Anesthesiologist/Type of Anesthesia:  Anesthesiologist: Yunior Concepcion D.O./General    Surgical Staff:  Assistant: Katie Mccollum P.A.-C.  Circulator: Mya Singh R.N.  Scrub Person: Nola Nixon    Specimens removed if any:  ID Type Source Tests Collected by Time Destination   A : Right inferior vulva Other Other PATHOLOGY SPECIMEN Jared Strauss M.D. 6/13/2025 11:05 AM    B : Right superior vulva Other Other PATHOLOGY SPECIMEN Jared Strauss M.D. 6/13/2025 11:05 AM        Estimated Blood Loss: Minimal    Findings: Examination anesthesia revealed a dysplastic lesion in the right inferior vulvar and erythematous lesion in the right superior vulva although this did not appear dysplastic and looks worrisome.  Previous suburethral lesion that was noted in the office I cannot appreciate.  Thus for this reason we did not proceed with a biopsy of the suburethral.  We have performed a 5 mm punch biopsy at the right superior and inferior vulva.    Complications: None        6/13/2025 11:28 AM Jared Strauss M.D.

## 2025-06-13 NOTE — ANESTHESIA PREPROCEDURE EVALUATION
Case: [id number] Date/Time: 06/13/25 0940    Procedure: VAGINAL EXAM UNDER ANESTHESIA, RIGHT VULVAR BIOPSY AND SUBURETHRAL BIOPSY    Pre-op diagnosis: VULVAR DYSPLASIA    Location: [facility] OR 17 / SURGERY [facility]    Surgeons: Jared Strauss M.D.            Relevant Problems   No relevant active problems       Physical Exam    Airway   Mallampati: II  TM distance: >3 FB  Neck ROM: full       Cardiovascular - normal exam  Rhythm: regular  Rate: normal    (-) murmur     Dental - normal exam           Pulmonary - normal examBreath sounds clear to auscultation     Abdominal    Neurological - normal exam                   Anesthesia Plan    ASA 2       Plan - general       Airway plan will be LMA          Induction: intravenous    Postoperative Plan: Postoperative administration of opioids is intended.    Pertinent diagnostic labs and testing reviewed    Informed Consent:    Anesthetic plan and risks discussed with patient.    Use of blood products discussed with: patient whom consented to blood products.

## 2025-07-10 ENCOUNTER — APPOINTMENT (OUTPATIENT)
Dept: ADMISSIONS | Facility: MEDICAL CENTER | Age: 70
End: 2025-07-10
Attending: SPECIALIST
Payer: MEDICARE

## 2025-07-11 ENCOUNTER — PRE-ADMISSION TESTING (OUTPATIENT)
Dept: ADMISSIONS | Facility: MEDICAL CENTER | Age: 70
End: 2025-07-11
Attending: SPECIALIST
Payer: MEDICARE

## 2025-07-11 NOTE — PREADMIT AVS NOTE
Current Medications   Medication Instructions    EPINEPHrine (EPIPEN 2-VICKIE) 0.3 MG/0.3ML Solution Auto-injector solution for injection As needed medication, may take if needed, including morning of procedure . Please notify surgeon & anesthesiologist if taken within week prior to surgery.    acetaminophen (TYLENOL) 325 MG Tab As needed medication, may take if needed, including morning of procedure     cetirizine (ZYRTEC) 10 MG Tab Continue taking as prescribed.    ibuprofen (MOTRIN) 200 MG Tab Stop 5 days before surgery    levothyroxine (SYNTHROID) 75 MCG Tab Continue taking as prescribed.    benazepril (LOTENSIN) 20 MG Tab Stop 24 hours before surgery    escitalopram (LEXAPRO) 10 MG Tab Continue taking as prescribed.    GARLIC PO Stop 7 days before surgery    CALCIUM PO Stop 7 days before surgery

## 2025-07-16 ENCOUNTER — PRE-ADMISSION TESTING (OUTPATIENT)
Dept: ADMISSIONS | Facility: MEDICAL CENTER | Age: 70
End: 2025-07-16
Attending: SPECIALIST
Payer: MEDICARE

## 2025-07-24 ENCOUNTER — HOSPITAL ENCOUNTER (INPATIENT)
Facility: MEDICAL CENTER | Age: 70
LOS: 1 days | End: 2025-07-25
Attending: SPECIALIST | Admitting: SPECIALIST
Payer: MEDICARE

## 2025-07-24 ENCOUNTER — ANESTHESIA (OUTPATIENT)
Dept: SURGERY | Facility: MEDICAL CENTER | Age: 70
End: 2025-07-24
Payer: MEDICARE

## 2025-07-24 ENCOUNTER — ANESTHESIA EVENT (OUTPATIENT)
Dept: SURGERY | Facility: MEDICAL CENTER | Age: 70
End: 2025-07-24
Payer: MEDICARE

## 2025-07-24 PROCEDURE — 700111 HCHG RX REV CODE 636 W/ 250 OVERRIDE (IP): Performed by: ANESTHESIOLOGY

## 2025-07-24 PROCEDURE — 700101 HCHG RX REV CODE 250: Performed by: ANESTHESIOLOGY

## 2025-07-24 PROCEDURE — 700105 HCHG RX REV CODE 258: Performed by: SPECIALIST

## 2025-07-24 RX ORDER — CEFAZOLIN SODIUM 1 G/3ML
INJECTION, POWDER, FOR SOLUTION INTRAMUSCULAR; INTRAVENOUS PRN
Status: DISCONTINUED | OUTPATIENT
Start: 2025-07-24 | End: 2025-07-24 | Stop reason: SURG

## 2025-07-24 RX ORDER — LIDOCAINE HYDROCHLORIDE 20 MG/ML
INJECTION, SOLUTION EPIDURAL; INFILTRATION; INTRACAUDAL; PERINEURAL PRN
Status: DISCONTINUED | OUTPATIENT
Start: 2025-07-24 | End: 2025-07-24 | Stop reason: SURG

## 2025-07-24 RX ORDER — ROCURONIUM BROMIDE 10 MG/ML
INJECTION, SOLUTION INTRAVENOUS PRN
Status: DISCONTINUED | OUTPATIENT
Start: 2025-07-24 | End: 2025-07-24 | Stop reason: SURG

## 2025-07-24 RX ORDER — EPHEDRINE SULFATE 50 MG/ML
INJECTION, SOLUTION INTRAVENOUS PRN
Status: DISCONTINUED | OUTPATIENT
Start: 2025-07-24 | End: 2025-07-24 | Stop reason: SURG

## 2025-07-24 RX ORDER — DEXAMETHASONE SODIUM PHOSPHATE 4 MG/ML
INJECTION, SOLUTION INTRA-ARTICULAR; INTRALESIONAL; INTRAMUSCULAR; INTRAVENOUS; SOFT TISSUE PRN
Status: DISCONTINUED | OUTPATIENT
Start: 2025-07-24 | End: 2025-07-24 | Stop reason: SURG

## 2025-07-24 RX ORDER — SUCCINYLCHOLINE CHLORIDE 20 MG/ML
INJECTION INTRAMUSCULAR; INTRAVENOUS PRN
Status: DISCONTINUED | OUTPATIENT
Start: 2025-07-24 | End: 2025-07-24 | Stop reason: SURG

## 2025-07-24 RX ORDER — PHENYLEPHRINE HCL IN 0.9% NACL 1 MG/10 ML
SYRINGE (ML) INTRAVENOUS PRN
Status: DISCONTINUED | OUTPATIENT
Start: 2025-07-24 | End: 2025-07-24 | Stop reason: SURG

## 2025-07-24 RX ORDER — LABETALOL HYDROCHLORIDE 5 MG/ML
INJECTION, SOLUTION INTRAVENOUS PRN
Status: DISCONTINUED | OUTPATIENT
Start: 2025-07-24 | End: 2025-07-24 | Stop reason: SURG

## 2025-07-24 RX ORDER — ONDANSETRON 2 MG/ML
INJECTION INTRAMUSCULAR; INTRAVENOUS PRN
Status: DISCONTINUED | OUTPATIENT
Start: 2025-07-24 | End: 2025-07-24 | Stop reason: SURG

## 2025-07-24 RX ORDER — MAGNESIUM SULFATE HEPTAHYDRATE 40 MG/ML
INJECTION, SOLUTION INTRAVENOUS PRN
Status: DISCONTINUED | OUTPATIENT
Start: 2025-07-24 | End: 2025-07-24 | Stop reason: SURG

## 2025-07-24 RX ORDER — METOCLOPRAMIDE HYDROCHLORIDE 5 MG/ML
INJECTION INTRAMUSCULAR; INTRAVENOUS PRN
Status: DISCONTINUED | OUTPATIENT
Start: 2025-07-24 | End: 2025-07-24 | Stop reason: SURG

## 2025-07-24 RX ADMIN — SODIUM CHLORIDE, POTASSIUM CHLORIDE, SODIUM LACTATE AND CALCIUM CHLORIDE: 600; 310; 30; 20 INJECTION, SOLUTION INTRAVENOUS at 08:53

## 2025-07-24 RX ADMIN — CEFAZOLIN 2 G: 1 INJECTION, POWDER, FOR SOLUTION INTRAMUSCULAR; INTRAVENOUS at 08:58

## 2025-07-24 RX ADMIN — EPHEDRINE SULFATE 15 MG: 50 INJECTION, SOLUTION INTRAVENOUS at 09:09

## 2025-07-24 RX ADMIN — LABETALOL HYDROCHLORIDE 10 MG: 5 INJECTION, SOLUTION INTRAVENOUS at 09:56

## 2025-07-24 RX ADMIN — MAGNESIUM SULFATE HEPTAHYDRATE 2 G: 2 INJECTION, SOLUTION INTRAVENOUS at 09:43

## 2025-07-24 RX ADMIN — EPHEDRINE SULFATE 10 MG: 50 INJECTION, SOLUTION INTRAVENOUS at 10:58

## 2025-07-24 RX ADMIN — DEXAMETHASONE SODIUM PHOSPHATE 8 MG: 4 INJECTION INTRA-ARTICULAR; INTRALESIONAL; INTRAMUSCULAR; INTRAVENOUS; SOFT TISSUE at 08:58

## 2025-07-24 RX ADMIN — METOCLOPRAMIDE HYDROCHLORIDE 10 MG: 5 INJECTION INTRAMUSCULAR; INTRAVENOUS at 08:58

## 2025-07-24 RX ADMIN — FENTANYL CITRATE 50 MCG: 50 INJECTION, SOLUTION INTRAMUSCULAR; INTRAVENOUS at 09:42

## 2025-07-24 RX ADMIN — FENTANYL CITRATE 100 MCG: 50 INJECTION, SOLUTION INTRAMUSCULAR; INTRAVENOUS at 08:58

## 2025-07-24 RX ADMIN — SUGAMMADEX 200 MG: 100 INJECTION, SOLUTION INTRAVENOUS at 11:12

## 2025-07-24 RX ADMIN — SUCCINYLCHOLINE CHLORIDE 120 MG: 20 INJECTION, SOLUTION INTRAMUSCULAR; INTRAVENOUS at 08:58

## 2025-07-24 RX ADMIN — ONDANSETRON 4 MG: 2 INJECTION INTRAMUSCULAR; INTRAVENOUS at 08:58

## 2025-07-24 RX ADMIN — LIDOCAINE HYDROCHLORIDE 40 MG: 20 INJECTION, SOLUTION EPIDURAL; INFILTRATION; INTRACAUDAL; PERINEURAL at 08:58

## 2025-07-24 RX ADMIN — ROCURONIUM BROMIDE 50 MG: 10 INJECTION INTRAVENOUS at 09:05

## 2025-07-24 RX ADMIN — FENTANYL CITRATE 50 MCG: 50 INJECTION, SOLUTION INTRAMUSCULAR; INTRAVENOUS at 09:29

## 2025-07-24 RX ADMIN — ROCURONIUM BROMIDE 30 MG: 10 INJECTION INTRAVENOUS at 09:58

## 2025-07-24 RX ADMIN — PROPOFOL 110 MG: 10 INJECTION, EMULSION INTRAVENOUS at 08:58

## 2025-07-24 RX ADMIN — Medication 200 MCG: at 09:08

## 2025-07-24 SDOH — ECONOMIC STABILITY: TRANSPORTATION INSECURITY
IN THE PAST 12 MONTHS, HAS LACK OF RELIABLE TRANSPORTATION KEPT YOU FROM MEDICAL APPOINTMENTS, MEETINGS, WORK OR FROM GETTING THINGS NEEDED FOR DAILY LIVING?: NO

## 2025-07-24 SDOH — ECONOMIC STABILITY: TRANSPORTATION INSECURITY
IN THE PAST 12 MONTHS, HAS THE LACK OF TRANSPORTATION KEPT YOU FROM MEDICAL APPOINTMENTS OR FROM GETTING MEDICATIONS?: NO

## 2025-07-24 ASSESSMENT — LIFESTYLE VARIABLES
CONSUMPTION TOTAL: NEGATIVE
TOTAL SCORE: 0
AVERAGE NUMBER OF DAYS PER WEEK YOU HAVE A DRINK CONTAINING ALCOHOL: 0
HAVE YOU EVER FELT YOU SHOULD CUT DOWN ON YOUR DRINKING: NO
EVER FELT BAD OR GUILTY ABOUT YOUR DRINKING: NO
ON A TYPICAL DAY WHEN YOU DRINK ALCOHOL HOW MANY DRINKS DO YOU HAVE: 0
HAVE PEOPLE ANNOYED YOU BY CRITICIZING YOUR DRINKING: NO
DOES PATIENT WANT TO STOP DRINKING: NO
TOTAL SCORE: 0
HOW MANY TIMES IN THE PAST YEAR HAVE YOU HAD 5 OR MORE DRINKS IN A DAY: 0
ALCOHOL_USE: NO
EVER HAD A DRINK FIRST THING IN THE MORNING TO STEADY YOUR NERVES TO GET RID OF A HANGOVER: NO
TOTAL SCORE: 0

## 2025-07-24 ASSESSMENT — PAIN DESCRIPTION - PAIN TYPE
TYPE: SURGICAL PAIN
TYPE: ACUTE PAIN

## 2025-07-24 ASSESSMENT — COGNITIVE AND FUNCTIONAL STATUS - GENERAL
MOBILITY SCORE: 22
CLIMB 3 TO 5 STEPS WITH RAILING: A LITTLE
SUGGESTED CMS G CODE MODIFIER MOBILITY: CJ
SUGGESTED CMS G CODE MODIFIER DAILY ACTIVITY: CH
DAILY ACTIVITIY SCORE: 24
WALKING IN HOSPITAL ROOM: A LITTLE

## 2025-07-24 ASSESSMENT — SOCIAL DETERMINANTS OF HEALTH (SDOH)
WITHIN THE PAST 12 MONTHS, YOU WORRIED THAT YOUR FOOD WOULD RUN OUT BEFORE YOU GOT THE MONEY TO BUY MORE: NEVER TRUE
WITHIN THE LAST YEAR, HAVE YOU BEEN AFRAID OF YOUR PARTNER OR EX-PARTNER?: NO
WITHIN THE PAST 12 MONTHS, THE FOOD YOU BOUGHT JUST DIDN'T LAST AND YOU DIDN'T HAVE MONEY TO GET MORE: NEVER TRUE
WITHIN THE LAST YEAR, HAVE TO BEEN RAPED OR FORCED TO HAVE ANY KIND OF SEXUAL ACTIVITY BY YOUR PARTNER OR EX-PARTNER?: NO
IN THE PAST 12 MONTHS, HAS THE ELECTRIC, GAS, OIL, OR WATER COMPANY THREATENED TO SHUT OFF SERVICE IN YOUR HOME?: NO
WITHIN THE LAST YEAR, HAVE YOU BEEN KICKED, HIT, SLAPPED, OR OTHERWISE PHYSICALLY HURT BY YOUR PARTNER OR EX-PARTNER?: NO
WITHIN THE LAST YEAR, HAVE YOU BEEN HUMILIATED OR EMOTIONALLY ABUSED IN OTHER WAYS BY YOUR PARTNER OR EX-PARTNER?: NO

## 2025-07-24 ASSESSMENT — PATIENT HEALTH QUESTIONNAIRE - PHQ9
1. LITTLE INTEREST OR PLEASURE IN DOING THINGS: NOT AT ALL
SUM OF ALL RESPONSES TO PHQ9 QUESTIONS 1 AND 2: 0
2. FEELING DOWN, DEPRESSED, IRRITABLE, OR HOPELESS: NOT AT ALL

## 2025-07-24 ASSESSMENT — FIBROSIS 4 INDEX: FIB4 SCORE: 1.71

## 2025-07-24 NOTE — PROGRESS NOTES
1118: Pt arrived from OR post surgery under anesthesia. Pt is awake. 4 sites to abdomen with telfa and tegaderm are CDI; one site with GALINA drain. Cardiac rhythm appears to be SR with BBB.     1200: Pt tolerating orals.     1330: Report to SAYRA Roach.     1345: Pt to room via bed with transport.

## 2025-07-24 NOTE — DISCHARGE INSTRUCTIONS
Follow up with surgeon in office as instructed or sooner if needed. Call to make follow up appointment if you don't already have one. Also call for questions or concerns.    Call the office [phone] if you develop any fevers, chills, nausea/vomiting, heavy vaginal bleeding or redness, tenderness, and/or drainage from your wounds, persistent watery discharge while walking or stool draining from the vagina.    No heavy lifting greater than 10 pounds for a minimum of 6 weeks, and until cleared by our office.    Nothing in the vagina (ie: no tampons douching, intercourse,) for a minimum of 6 weeks, and until cleared by our office    Showering with warm water is OK, no bathing or swimming or submersing in water.    You may remove wound dressing tomorrow and place loose bandages for the next 2 weeks.    You may have vaginal spotting or light bleeding which is normal.    No driving, alcohol, spending money, signing legal documents, or operating mechanical equipment x 24 hours.    No driving while taking narcotics.    If you have not had a bowel movement for 2 days after surgery, please take over the counter Milk of Magnesium, 1 tablespoon every 4 hours. After 4 doses and if you still have not had a bowel movement, please call your doctor.    You may eat a soft diet, such a soup and liquids, after surgery and if tolerating you may resume your regular diet.    If you have had urinary stent(s) placed, please make arrangements to have removed 6 weeks after surgery.    If you have had a bowel resection, do not use suppositories.    You should have a responsible adult with your for the next 24 hours due to having anesthesia.    A sore throat is normal today.    If you notice trouble breathing, fever, chills, difficulty urinating, excessive nausea, bleeding, or excessive pain call the Surgeon and come back to the Emergency Room.

## 2025-07-24 NOTE — ANESTHESIA PREPROCEDURE EVALUATION
Case: [id number] Date/Time: 07/24/25 0810    Procedures:       ROBOTIC RIGHT RADICAL VULVECTOMY WITH RIGHT SENTINEL GROIN NODE SAMPLING      VULVECTOMY, RADICAL    Pre-op diagnosis: VULVAR CANCER    Location: [place] [address] / SURGERY [facility]    Surgeons: Jared Strauss M.D.            Relevant Problems   CARDIAC   (positive) Hypertension      ENDO   (positive) Hypothyroidism      Other   (positive) Anxiety       Physical Exam    Airway   Mallampati: II  TM distance: >3 FB  Neck ROM: full       Cardiovascular - normal exam  Rhythm: regular  Rate: normal    (-) murmur     Dental - normal exam           Pulmonary - normal examBreath sounds clear to auscultation     Abdominal    Neurological - normal exam                   Anesthesia Plan    ASA 2       Plan - general       Airway plan will be ETT          Induction: intravenous    Postoperative Plan: Postoperative administration of opioids is intended.    Pertinent diagnostic labs and testing reviewed    Informed Consent:    Anesthetic plan and risks discussed with patient.    Use of blood products discussed with: patient whom consented to blood products.

## 2025-07-24 NOTE — ANESTHESIA TIME REPORT
Anesthesia Start and Stop Event Times       Date Time Event    7/24/2025 0844 Ready for Procedure     0853 Anesthesia Start     1119 Anesthesia Stop          Responsible Staff  07/24/25      Name Role Begin End    Ankur Nieves M.D. Anesth 0853 1119          Overtime Reason:  no overtime (within assigned shift)    Comments:

## 2025-07-24 NOTE — OP REPORT
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    Anesthesiologist/Type of Anesthesia:  Anesthesiologist: Ankur Nieves M.D./General    Surgical Staff:  Circulator: Shayla Mauro R.N.  Relief Circulator: Rochelle Duncan R.N.  Scrub Person: Nola Nixon  First Assist: Carrie Reese P.A.-C.    Specimens removed if any:  ID Type Source Tests Collected by Time Destination   A : right deep inguinal lymph node Tissue Lymph Node PATHOLOGY SPECIMEN Jared Strauss M.D. 7/24/2025 10:18 AM    B : right cloquet's lymph node Tissue Lymph Node PATHOLOGY SPECIMEN Jared Strauss M.D. 7/24/2025 10:21 AM    C : right superficial groin nodes Tissue Lymph Node PATHOLOGY SPECIMEN Jared Strauss M.D. 7/24/2025 10:21 AM    D : right vulva Other Other PATHOLOGY SPECIMEN Jared Strauss M.D. 7/24/2025 10:49 AM        Estimated Blood Loss: 20 cc    Findings: Examination anesthesia revealed an intubated post biopsy 1 x 1 cm lesion in the superior aspect of the right labia majora this mass was about a centimeter lateral to with the clitoris and the urethra was.  There was no suspicious inguinal lymphadenopathy.    [place] lymph node mapping unfortunately did not map thus we proceeded along with the right inguinofemoral lymphadenectomy    Complications: None    Indication: Mrs. Silva is a very pleasant 69-year-old female who is well-known to me.  Patient has history of cervical cancer and most recently was diagnosed with a T1 N0 M0 grade 1 squamous cell carcinoma of the vulva at the right superior aspect of the labia majora.  Patient underwent a metastatic workup which revealed no evidence of metastatic disease.  She has been brought to the operating room today to  undergo a right radical vulvectomy as this is a unilateral lesion and sentinel inguinal sampling.  However the sentinel lymph node did not map for this reason I proceeded along with the formal inguinal femoral lymphadenectomy.    Surgical assist was needed for this case as Ms. Reese provided countertraction and exposure and irrigation to keep the operative field clear in order for me to facilitate the dissection.    Procedure: After achieving adequate general esthesia patient was prepped and draped and placed in modified dorsolithotomy position.  Surgical timeout was called after the surgical team agreed with proceeded.    I initially injected approximately 4 cc of indocyanine green into the tumor and superiorly.    In order to demarcate the landmark or where I needed to have the dissection I placed an 18-gauge Angiocath at the right pubic tubercle which will serve as my landmark.  Initially I made an incision at the umbilicus a 5 mm incision was made.  I then entered with the direct entry with the 5 mm endoscope ensuring that I did not enter the peritoneal cavity.  After this port was appropriately placed I then sized it up to a 12 mm port with the balloon which I dilated.  I then dissected the subcutaneous fat across the anterior abdominal wall to the groin region.  After the balloon was appropriately dilated I then insufflated the extraperitoneal with pneumoperitoneum.  Appropriate pneumoperitoneum was achieved.  I then subsequently placed another incision 5 mm incision in the left anterior abdominal wall directly across 10 cm lateral to my umbilical port.  Again direct laparoscopic entry was made to ensure that I was in the proper extraperitoneal space.  After this was then completed I then upsized the 5 mm port to 8 mm robotic ports.  Similar port was then placed on the right inner anterior abdominal wall 8 cm lateral to my umbilicus and a subsequent 8 cm lateral to the right inner port was then placed under  direct endoscopic visualization entry site again ensuring that I was in the extraperitoneal space.  After these were ports were appropriately placed and extra pneumoperitoneum was achieved I had undocked the robot.  Instrumentation was then inserted under direct visualization.      After completion of this using a 30 degree  scope down I went ahead and dissected the plane between the campers and Felix's fascia the subcutaneous fat was taken down and dissected off the rectus fascia.  I then turned on the firefly and we had tried to attempt to identify the sentinel groin node.  We were unsuccessful in mapping the sentinel groin node as there was no uptake despite evaluating for 30 minutes.  I went ahead and proceeded with my dissection using chromone dissection we avoided cutting into the fat.  I did not identify the 18-gauge Angiocath which I used as a flagpole to orient myself.  The Angiocath was easily identified I went 2 cm lateral and started my dissection along here.  During this dissection I continuously turned on the firefly to try to identify the sentinel groin no as it did not map.  It is for this reason I proceeded along with a formal superficial inguinal lymph node dissection followed by the deep inguinal lymph node dissection.  I went ahead and identified the superficial epigastric vein and the anterior abdominal wall this vein was identified and the course of this vein was then followed dorsally into the anterior abdominal wall to the anterior rectus fascia.  The superficial epigastric vein had drain into the femoral and I was able to identify laterally the external circumflex iliac vein and medially the external pudendal vein.  These 3 veins were identified draining into the femoral vein which I meticulously skeletonized and dissected out.  The fat-containing around this vein was meticulously skeletonized and dissected I used the Synchro seal to seal the fat to prevent any form of potential seroma or  lymphatic drainage.  After the fat was cleaned off I was able to identify the saphenous vein that drain into the saphenofemoral junction medially.  Great care was undertaken to not compromise the vessels.  We cleaned off all the superficial groin nodes that is above the fascia and placed in the Endobag and subsequently delivered through.  I was then able to identify the femoral sheath and then I started my dissection right where the superficial epigastric vein and the external pudendal vein and the circumflex iliac vein draining into the femoral vein.  The femoral sheath was incised parallel along to the stitch distally and I opened up the femoral sheath identifying the femoral vein and then laterally to the femoral artery and lateral to that was the femoral nerve which we identified.  I saw the sartorius muscle which serves as the lateral boundary and superior boundary was the inguinal ligament while the medial boundary we saw the adductor longus and the saphenous vein draining into the saphenofemoral junction.  Using again the Synchro seal we perform a deep femoral node dissection again taking off lymphoid tissues anterior to the femoral artery and the vein and lateral to this.  We did not remove all of the fat along the femoral nerve II.  Serve the femoral nerve.  After this was completed we then copiously irrigated the inguinal fossa to ensure hemostasis once this was assured we counted for sponges needles and instrument counts I then brought a GALINA drain through the right outer robotic port and secured it and the extra peritoneum was then evacuated.  The subcutaneous tissue was irrigated water hemostasis was established the skin was reapproximated with 3-0 Monocryl sutures.    After completion of the inguinofemoral lymphadenectomy I turned my focus towards the perineal phase.  We identified the vulvar lesion in the right superior labia majora the lesion was approximately a 1 x 1 cm and encroach the right lateral  aspect of the clitoris and also the urethra the distance between the most outer edge of the lesion and the most lateral edge of the urethra and the clitoris was about a centimeter I came right up against the edge of the urethra laterally and the clitoris I was able to get a good margin superiorly and laterally and posteriorly and inferiorly along the vagina.  This was demarcated with a blue marking pen.  Using a 15 blade scalpel I then incised went ahead and proceeded on with a radical vulvectomy the entire right labia majora was excised down to the level of the peritoneal fascia and to the bone along with the subcutaneous tissue.  We also took out the bulbocavernosus muscle.  After complete excision I then oriented the specimen for the pathologist for evaluation.  The base of the excision site was cauterized for hemostasis.  The bulbocavernosus muscle was bleeding we can achieve hemostasis.  After completion of this I went ahead and performed a layered closure reapproximating the subcutaneous tissue with 2-0 Vicryl suture in 2 layer followed by the dermal layer with 2-0 Monocryl suture.  I then reapproximated the epidermal layer with interrupted figure-of-eight mattress suture 6 interrupted sutures were placed excellent tension-free closure was obtained.    Patient tolerated procedure well without any difficulties was extubated and transferred to the PACU in stable condition.

## 2025-07-24 NOTE — OR SURGEON
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    Anesthesiologist/Type of Anesthesia:  Anesthesiologist: Ankur Nieves M.D./General    Surgical Staff:  Circulator: Shayla Mauro R.N.  Relief Circulator: Rochelle Duncan R.N.  Scrub Person: Nola Nixon  First Assist: Carrie Reese P.A.-C.    Specimens removed if any:  ID Type Source Tests Collected by Time Destination   A : right deep inguinal lymph node Tissue Lymph Node PATHOLOGY SPECIMEN Jared Strauss M.D. 7/24/2025 10:18 AM    B : right cloquet's lymph node Tissue Lymph Node PATHOLOGY SPECIMEN Jared Strauss M.D. 7/24/2025 10:21 AM    C : right superficial groin nodes Tissue Lymph Node PATHOLOGY SPECIMEN Jared Strauss M.D. 7/24/2025 10:21 AM    D : right vulva Other Other PATHOLOGY SPECIMEN Jared Strauss M.D. 7/24/2025 10:49 AM        Estimated Blood Loss: 20 cc    Findings: Examination anesthesia revealed an intubated post biopsy 1 x 1 cm lesion in the superior aspect of the right labia majora this mass was about a centimeter lateral to with the clitoris and the urethra was.  There was no suspicious inguinal lymphadenopathy.    [place] lymph node mapping unfortunately did not map thus we proceeded along with the right inguinofemoral lymphadenectomy    Complications: None        7/24/2025 11:29 AM Jared Strauss M.D.

## 2025-07-24 NOTE — PROGRESS NOTES
Pharmacy Medication Reconciliation      ~Medication reconciliation updated and complete per patient   ~Allergies have been verified and updated   ~No outpatient Antimicrobials in the last 30 days  ~Is dispense history available in EPIC: no  ~Patient home pharmacy :  [facility] [phone]      ~Anticoagulants (rivaroxaban, apixaban, edoxaban, dabigatran, warfarin, enoxaparin) taken in the last 14 days? NO

## 2025-07-24 NOTE — ANESTHESIA PROCEDURE NOTES
Airway    Date/Time: 7/24/2025 8:59 AM    Performed by: Ankur Nieves M.D.  Authorized by: Ankur Nieves M.D.    Location:  OR  Urgency:  Elective  Indications for Airway Management:  Anesthesia      Spontaneous Ventilation: absent    Sedation Level:  Deep  Preoxygenated: Yes    Patient Position:  Sniffing  Mask Difficulty Assessment:  0 - not attempted  Final Airway Type:  Endotracheal airway  Final Endotracheal Airway:  ETT  Cuffed: Yes    Technique Used for Successful ETT Placement:  Direct laryngoscopy    Insertion Site:  Oral  Blade Type:  Girish  Laryngoscope Blade/Videolaryngoscope Blade Size:  3  ETT Size (mm):  7.0  Measured from:  Teeth  ETT to Teeth (cm):  20  Placement Verified by: auscultation and capnometry    Cormack-Lehane Classification:  Grade I - full view of glottis  Number of Attempts at Approach:  1

## 2025-07-24 NOTE — ANESTHESIA POSTPROCEDURE EVALUATION
Patient: Pallavi Silva    Procedure Summary       Date: 07/24/25 Room / Location: [place] [address] / SURGERY [facility]    Anesthesia Start: 0853 Anesthesia Stop: 1119    Procedures:       RIGHT RADICAL VULVECTOMY WITH ROBOTIC RIGHT GROIN NODE SAMPLING (Groin)      VULVECTOMY, RADICAL (Right: Vulva) Diagnosis: (vulvar cancer)    Surgeons: Jared Strauss M.D. Responsible Provider: Ankur Nieves M.D.    Anesthesia Type: general ASA Status: 2            Final Anesthesia Type: general  Last vitals  BP   Blood Pressure : (!) 167/80    Temp   36.1 °C (96.9 °F)    Pulse   77   Resp   15    SpO2   98 %      Anesthesia Post Evaluation    Patient location during evaluation: PACU  Patient participation: complete - patient participated  Level of consciousness: awake and alert    Airway patency: patent  Anesthetic complications: no  Cardiovascular status: hemodynamically stable  Respiratory status: acceptable  Hydration status: euvolemic    PONV: none          No notable events documented.     Nurse Pain Score: 0 (NPRS)

## 2025-07-25 ENCOUNTER — PHARMACY VISIT (OUTPATIENT)
Dept: PHARMACY | Facility: MEDICAL CENTER | Age: 70
End: 2025-07-25
Payer: COMMERCIAL

## 2025-07-25 PROCEDURE — RXMED WILLOW AMBULATORY MEDICATION CHARGE

## 2025-07-25 RX ORDER — TRAMADOL HYDROCHLORIDE 50 MG/1
TABLET ORAL
Qty: 28 TABLET | Refills: 0 | OUTPATIENT
Start: 2025-07-25

## 2025-07-25 ASSESSMENT — PAIN DESCRIPTION - PAIN TYPE
TYPE: ACUTE PAIN

## 2025-07-25 NOTE — DISCHARGE PLANNING
Care Transition Team Assessment    Patient is a 69 year old female admitted for vulvar cancer. Please see patient's H&P for past medical history. RNCM met with patient at bedside to complete assessment. Patient is A/Ox4 and able to verify information on the face sheet. Patient lives with her spouse in a single story house with 1 step to enter, Kalyan Silva (p)[phone]; emergency contact and NOK. No Advanced directive on file; AD booklet given. Patient reports, prior to admission patient is independent with ADL's and IADL's. Patient has a FWW. Patient reports her spouse is good support for her. Patient receives monthly SSI deposits of $1200. Patient's PCP is Lesa Sharma. Patient's preferred pharmacy is [facility]. Patient denies a history of SNF/IPR nor HHC use in the past. Patient denies any SA or MH concerns. Patient confirmed medical coverage via Medicare. Patient has means to transportation and her spouse will provide transport once medically stable for discharge. Patient's primary oncologist is Dr. Strauss.     Information Source  Orientation Level: Oriented X4  Information Given By: Patient  Who is responsible for making decisions for patient? : Patient    Readmission Evaluation  Is this a readmission?: No    Elopement Risk  Legal Hold: No  Ambulatory or Self Mobile in Wheelchair: Yes  Disoriented: No  Psychiatric Symptoms: None  History of Wandering: No  Elopement this Admit: No  Vocalizing Wanting to Leave: No  Displays Behaviors, Body Language Wanting to Leave: No-Not at Risk for Elopement    Interdisciplinary Discharge Planning  Lives with - Patient's Self Care Capacity: Spouse  Patient or legal guardian wants to designate a caregiver: No  Support Systems: Friends / Neighbors, Family Member(s)  Housing / Facility: 1 Story House    Discharge Preparedness  What is your plan after discharge?: Home with help  What are your discharge supports?: Spouse  Prior Functional Level: Ambulatory  Difficulity  with ADLs: None  Difficulity with IADLs: None    Functional Assesment  Prior Functional Level: Ambulatory    Finances  Financial Barriers to Discharge: No  Prescription Coverage: Yes    Vision / Hearing Impairment  Vision Impairment : Yes  Right Eye Vision: Wears Glasses  Left Eye Vision: Wears Glasses  Hearing Impairment : No         Advance Directive  Advance Directive?: None  Advance Directive offered?: AD Booklet given    Domestic Abuse  Possible Abuse/Neglect Reported to:: Not Applicable    Psychological Assessment  History of Substance Abuse: None  History of Psychiatric Problems: No  Non-compliant with Treatment: No  Newly Diagnosed Illness: No    Discharge Risks or Barriers  Discharge risks or barriers?: No  Patient risk factors: Complex medical needs    Anticipated Discharge Information  Discharge Disposition: Discharged to home/self care (01)

## 2025-07-25 NOTE — DISCHARGE SUMMARY
Discharge Summary    CHIEF COMPLAINT ON ADMISSION  No chief complaint on file.      Reason for Admission  Vulvar cancer (HCC)     Admission Date  7/24/2025    CODE STATUS  Full Code    HPI & HOSPITAL COURSE  This is a 69 y.o. 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 Strauss. She was subsequently admitted for bedrest and observation. She had an uneventful post-operative course and was able to ambulate without difficulty, void spontaneously, tolerate pain with oral pain medication, and tolerate a regular diet.  No notes on file    Therefore, she is discharged in good and stable condition to home with close outpatient follow-up.    The patient met 2-midnight criteria for an inpatient stay at the time of discharge.    Discharge Date  07/25/2025    FOLLOW UP ITEMS POST DISCHARGE  Follow-up with [facility] for treatment planning.  Measure GALINA drain output and remove GALINA drain once <50 cc output in 24 hr period.    DISCHARGE DIAGNOSES  Active Problems:    Hypertension (POA: Yes)    Hypothyroidism (POA: Yes)      Overview: Hypothyroid    Anxiety (POA: Yes)  Resolved Problems:    * No resolved hospital problems. *      FOLLOW UP  No future appointments.  Carrie Reese P.A.-C.  [id number]  [place] [id number] [phone]    Follow up on 8/20/2025  1:30 PM      MEDICATIONS ON DISCHARGE     Medication List        START taking these medications        Instructions   traMADol 50 MG Tabs  Commonly known as: Ultram   Take 1 tablet by mouth every 6 hours as needed for pain for up to 7 days            CONTINUE taking these medications        Instructions   acetaminophen 325 MG Tabs  Commonly known as: Tylenol   Take 325 mg by mouth every four hours as needed for Mild Pain.  Dose: 325 mg     benazepril 20 MG Tabs  Commonly known as: Lotensin   Take 20 mg by mouth every morning.  "  Dose: 20 mg     CALCIUM PO   Take 1 Tablet by mouth every day. Plus Vitamin D  Dose: 1 Tablet     cetirizine 10 MG Tabs  Commonly known as: ZyrTEC   Take 10 mg by mouth every day.  Dose: 10 mg     EpiPen 2-Alphonso 0.3 MG/0.3ML Soaj solution for injection  Generic drug: EPINEPHrine   inject as directed Injection once as needed for allergic reaction; Duration: 30 days     escitalopram 10 MG Tabs  Commonly known as: Lexapro   Take 10 mg by mouth every morning.   Dose: 10 mg     GARLIC PO   Take 3 Tablets by mouth every day.  Dose: 3 Tablet     ibuprofen 200 MG Tabs  Commonly known as: Motrin   Take 400 mg by mouth every 8 hours as needed for Mild Pain.  Dose: 400 mg     levothyroxine 75 MCG Tabs  Commonly known as: Synthroid   Take 75 mcg by mouth every morning on an empty stomach.  Dose: 75 mcg              Allergies  Allergies[1]    DIET  Orders Placed This Encounter   Procedures    Diet Order Diet: Regular     Standing Status:   Standing     Number of Occurrences:   1     Diet::   Regular [1]       ACTIVITY  Light duty.  10-lb lifting restriction    CONSULTATIONS  None    PROCEDURES  7/24/25 RIGHT RADICAL VULVECTOMY WITH ROBOTIC RIGHT GROIN NODE SAMPLING     LABORATORY  Lab Results   Component Value Date    SODIUM 127 (L) 07/25/2025    POTASSIUM 5.2 07/25/2025    CHLORIDE 94 (L) 07/25/2025    CO2 21 07/25/2025    GLUCOSE 111 (H) 07/25/2025    BUN 14 07/25/2025    CREATININE 0.69 07/25/2025        Lab Results   Component Value Date    WBC 16.4 (H) 07/25/2025    HEMOGLOBIN 11.2 (L) 07/25/2025    HEMATOCRIT 34.0 (L) 07/25/2025    PLATELETCT 267 07/25/2025        Total time of the discharge process exceeds 20 minutes.         [1]   Allergies  Allergen Reactions    Bee Pollen Anaphylaxis    Diagnostic X-Ray Materials Anaphylaxis     Iodine Contrast    Gadolinium Anaphylaxis           Iodine Anaphylaxis    Shellfish Allergy Anaphylaxis     Allergic To Oysters    Wasp Venom Anaphylaxis    Other Drug Palpitations     \"Numbing " "agent at dentist\"    Other Environmental Itching     Pine trees. \"Unable to see\" water eyes itching    Latex Rash     PAPER TAPE OKAY     "

## 2025-07-25 NOTE — PROGRESS NOTES
Placed leg bag on patients jenkins catheter. Education on switching bags, emptying, and cleaning provided by this RN. Patient provided with 2L urinary drainage bag and alcohol swabs. All questions answered. IV removed, DC lounge orders placed.

## 2025-07-25 NOTE — PROGRESS NOTES
DC instructions reviewed w/ pt, verbalized understanding, armband removed. Meds to bed delivered.

## 2025-07-25 NOTE — PROGRESS NOTES
4 Eyes Skin Assessment Completed by Marley MORTON RN and Shea NOYOLA RN.    Skin assessment is primarily focused on high risk bony prominences. Pay special attention to skin beneath and around medical devices, high risk bony prominences, skin to skin areas and areas where the patient lacks sensation to feel pain and areas where the patient previously had breakdown.     Head (Occipital):  WDL   Ears (Under Medical Devices): WDL   Nose (Under Medical Devices): WDL   Mouth:  WDL   Neck: WDL   Breast/Chest:  WDL   Shoulder Blades:  WDL   Spine:   WDL   (R) Arm/Elbow/Hand: WDL   (L) Arm/Elbow/Hand: WDL and Bruising   Abdomen: 4 lap sites and GALINA drain    Pannus/Groin:  Right labia surgical incision CDI with sutures   Sacrum/Coccyx:   WDL   (R) Ischial Tuberosity (Sit Bones):  WDL   (L) Ischial Tuberosity (Sit Bones):  WDL   (R) Leg:  WDL   (L) Leg:  WDL   (R) Heel:  WDL   (R) Foot/Toe: WDL   (L) Heel: WDL   (L) Foot/Toe:  WDL       DEVICES IN USE:   Respiratory Devices:  Nasal cannula  Feeding Devices:  N/A   Lines & BP Monitoring Devices:  Peripheral IV and Pulse ox    Orthopedic Devices:  N/A  Miscellaneous Devices:  N/A    PROTOCOL INTERVENTIONS:   Standard/Trauma Bed:  Already in place  Juarez: already in place     WOUND PHOTOS:   N/A no wounds identified    WOUND CONSULT:   N/A, no advanced wound care needs identified

## 2025-07-25 NOTE — CARE PLAN
The patient is Watcher - Medium risk of patient condition declining or worsening    Shift Goals  Clinical Goals: monitor vital signs and mitesh drain. bedrest  Patient Goals: rest    Progress made toward(s) clinical / shift goals:  Pt is A&Ox4, agrees with POC for this shift. Pt did not report pain during this shift. Strict bed rest in place, educated pt. No questions or other needs at this time.     Patient is not progressing towards the following goals:NA

## (undated) DEVICE — GLOVE COTTON STERILE (50/CA)

## (undated) DEVICE — SET EXTENSION WITH 2 PORTS (48EA/CA) ***PART #2C8610 IS A SUBSTITUTE*****

## (undated) DEVICE — GLOVE BIOGEL PI ORTHO SZ 7.5 PF LF (40PR/BX)

## (undated) DEVICE — ELECTRODE LEEP 1.0X1.0 - (10EA/CA)

## (undated) DEVICE — SUTURE GENERAL

## (undated) DEVICE — SENSOR OXIMETER ADULT SPO2 RD SET (20EA/BX)

## (undated) DEVICE — GLOVE SZ 6.5 BIOGEL PI MICRO - PF LF (50PR/BX)

## (undated) DEVICE — MASK AIRWAY SIZE 4 UNIQUE SILICON (10EA/BX)

## (undated) DEVICE — SUCTION INSTRUMENT YANKAUER BULBOUS TIP W/O VENT (50EA/CA)

## (undated) DEVICE — GLOVE BIOGEL PI INDICATOR SZ 6.5 SURGICAL PF LF - (50/BX 4BX/CA)

## (undated) DEVICE — ELECTRODE DUAL RETURN W/ CORD - (50/PK)

## (undated) DEVICE — Device

## (undated) DEVICE — SUTURE 2-0 VICRYL PLUS CT-2 - 27 INCH (36/BX)

## (undated) DEVICE — GOWN WARMING STANDARD FLEX - (30/CA)

## (undated) DEVICE — SLEEVE, VASO, THIGH, MED

## (undated) DEVICE — TRAY SRGPRP PVP IOD WT PRP - (20/CA)

## (undated) DEVICE — COVER LIGHT HANDLE ALC PLUS DISP (18EA/BX)

## (undated) DEVICE — PAD SANITARY 11IN MAXI IND WRAPPED (12EA/PK 24PK/CA)

## (undated) DEVICE — CANISTER SUCTION 3000ML MECHANICAL FILTER AUTO SHUTOFF MEDI-VAC NONSTERILE LF DISP (40EA/CA)

## (undated) DEVICE — BLADE SURGICAL #15 - (50/BX 3BX/CA)

## (undated) DEVICE — SODIUM CHL IRRIGATION 0.9% 1000ML (12EA/CA)

## (undated) DEVICE — SUTURE 2-0 MONOCRYL SH

## (undated) DEVICE — WATER IRRIGATION STERILE 1000ML (12EA/CA)

## (undated) DEVICE — TUBING CLEARLINK DUO-VENT - C-FLO (48EA/CA)

## (undated) DEVICE — LACTATED RINGERS INJ 1000 ML - (14EA/CA 60CA/PF)

## (undated) DEVICE — BRIEF STRETCH MATERNITY M/L - FITS 20-60IN (5EA/BG 20BG/CA)

## (undated) DEVICE — SLEEVE VASO DVT COMPRESSION CALF MED - (10PR/CA)

## (undated) DEVICE — SET LEADWIRE 5 LEAD BEDSIDE DISPOSABLE ECG (1SET OF 5/EA)

## (undated) DEVICE — SUTURE 2-0 VICRYL PLUS SH - 27 INCH (36/BX)

## (undated) DEVICE — DRESSING NON ADHERENT 3 X 4 - STERILE (100/BX 12BX/CA)